# Patient Record
Sex: FEMALE | Race: WHITE | Employment: UNEMPLOYED
[De-identification: names, ages, dates, MRNs, and addresses within clinical notes are randomized per-mention and may not be internally consistent; named-entity substitution may affect disease eponyms.]

---

## 2017-02-24 ENCOUNTER — TELEPHONE (OUTPATIENT)
Dept: OBGYN | Facility: CLINIC | Age: 32
End: 2017-02-24

## 2017-02-24 ENCOUNTER — OFFICE VISIT (OUTPATIENT)
Facility: CLINIC | Age: 32
End: 2017-02-24

## 2017-02-24 ENCOUNTER — HOSPITAL ENCOUNTER (OUTPATIENT)
Age: 32
Setting detail: SPECIMEN
Discharge: HOME | End: 2017-02-24

## 2017-02-24 VITALS
HEART RATE: 103 BPM | TEMPERATURE: 98.5 F | OXYGEN SATURATION: 98 % | SYSTOLIC BLOOD PRESSURE: 100 MMHG | BODY MASS INDEX: 23.42 KG/M2 | HEIGHT: 63 IN | WEIGHT: 132.2 LBS | DIASTOLIC BLOOD PRESSURE: 80 MMHG

## 2017-02-24 DIAGNOSIS — N89.8 VAGINAL MASS: ICD-10-CM

## 2017-02-24 DIAGNOSIS — N89.8 VAGINAL DISCHARGE: Primary | ICD-10-CM

## 2017-02-24 PROCEDURE — 99215 OFFICE O/P EST HI 40 MIN: CPT | Performed by: PHYSICIAN ASSISTANT

## 2017-02-24 ASSESSMENT — ENCOUNTER SYMPTOMS
COUGH: 0
ABDOMINAL PAIN: 1
SHORTNESS OF BREATH: 0
CHEST TIGHTNESS: 0

## 2017-02-27 ENCOUNTER — HOSPITAL ENCOUNTER (OUTPATIENT)
Age: 32
Discharge: HOME | End: 2017-02-27
Payer: MEDICARE

## 2017-02-27 ENCOUNTER — OFFICE VISIT (OUTPATIENT)
Dept: OBGYN | Facility: CLINIC | Age: 32
End: 2017-02-27

## 2017-02-27 ENCOUNTER — HOSPITAL ENCOUNTER (OUTPATIENT)
Age: 32
Setting detail: SPECIMEN
Discharge: HOME | End: 2017-02-27
Payer: MEDICARE

## 2017-02-27 VITALS
WEIGHT: 128.1 LBS | DIASTOLIC BLOOD PRESSURE: 73 MMHG | BODY MASS INDEX: 23.57 KG/M2 | HEART RATE: 98 BPM | TEMPERATURE: 97.5 F | SYSTOLIC BLOOD PRESSURE: 109 MMHG | HEIGHT: 62 IN | RESPIRATION RATE: 16 BRPM

## 2017-02-27 DIAGNOSIS — N89.8 VAGINAL MASS: ICD-10-CM

## 2017-02-27 DIAGNOSIS — N89.8 VAGINAL DISCHARGE: Primary | ICD-10-CM

## 2017-02-27 DIAGNOSIS — Z12.4 CERVICAL CANCER SCREENING: ICD-10-CM

## 2017-02-27 DIAGNOSIS — N89.8 VAGINAL DISCHARGE: ICD-10-CM

## 2017-02-27 LAB
DIRECT EXAM: ABNORMAL
Lab: ABNORMAL
Lab: ABNORMAL
SPECIMEN DESCRIPTION: ABNORMAL
SPECIMEN DESCRIPTION: ABNORMAL
STATUS: ABNORMAL
STATUS: ABNORMAL

## 2017-02-27 PROCEDURE — 99202 OFFICE O/P NEW SF 15 MIN: CPT | Performed by: OBSTETRICS & GYNECOLOGY

## 2017-02-27 PROCEDURE — 99213 OFFICE O/P EST LOW 20 MIN: CPT | Performed by: OBSTETRICS & GYNECOLOGY

## 2017-02-28 LAB
C TRACH DNA GENITAL QL NAA+PROBE: NEGATIVE
N. GONORRHOEAE DNA: NEGATIVE

## 2017-03-01 LAB
HPV SAMPLE: ABNORMAL
HPV SOURCE: ABNORMAL
HPV, GENOTYPE 16: NOT DETECTED
HPV, GENOTYPE 18: NOT DETECTED
HPV, HIGH RISK OTHER: DETECTED
HPV, INTERPRETATION: ABNORMAL

## 2017-03-01 RX ORDER — METRONIDAZOLE 500 MG/1
500 TABLET ORAL 3 TIMES DAILY
Qty: 30 TABLET | Refills: 0 | Status: SHIPPED | OUTPATIENT
Start: 2017-03-01 | End: 2017-03-11

## 2017-03-03 PROBLEM — R87.810 ASCUS WITH POSITIVE HIGH RISK HPV CERVICAL: Status: ACTIVE | Noted: 2017-03-03

## 2017-03-03 PROBLEM — R87.610 ASCUS WITH POSITIVE HIGH RISK HPV CERVICAL: Status: ACTIVE | Noted: 2017-03-03

## 2017-03-07 LAB — CYTOLOGY REPORT: NORMAL

## 2017-11-17 ENCOUNTER — TELEPHONE (OUTPATIENT)
Dept: OBGYN | Age: 32
End: 2017-11-17
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
## 2018-07-24 ENCOUNTER — HOSPITAL ENCOUNTER (OUTPATIENT)
Age: 33
End: 2018-07-24

## 2018-07-24 ENCOUNTER — APPOINTMENT (OUTPATIENT)
Dept: GENERAL RADIOLOGY | Age: 33
End: 2018-07-24

## 2018-07-24 ENCOUNTER — HOSPITAL ENCOUNTER (EMERGENCY)
Age: 33
Discharge: HOME | End: 2018-07-24
Attending: EMERGENCY MEDICINE

## 2018-07-24 VITALS
OXYGEN SATURATION: 98 % | RESPIRATION RATE: 17 BRPM | HEART RATE: 123 BPM | SYSTOLIC BLOOD PRESSURE: 105 MMHG | TEMPERATURE: 98.9 F | BODY MASS INDEX: 23.78 KG/M2 | WEIGHT: 130 LBS | DIASTOLIC BLOOD PRESSURE: 74 MMHG

## 2018-07-24 DIAGNOSIS — T74.21XA SEXUAL ASSAULT OF ADULT, INITIAL ENCOUNTER: Primary | ICD-10-CM

## 2018-07-24 LAB
CHP ED QC CHECK: YES
PREGNANCY TEST URINE, POC: NORMAL

## 2018-07-24 PROCEDURE — 99283 EMERGENCY DEPT VISIT LOW MDM: CPT

## 2018-07-24 RX ORDER — METRONIDAZOLE 500 MG/1
1000 TABLET ORAL ONCE
Status: DISCONTINUED | OUTPATIENT
Start: 2018-07-24 | End: 2018-07-25 | Stop reason: HOSPADM

## 2018-07-24 RX ORDER — IBUPROFEN 800 MG/1
800 TABLET ORAL EVERY 8 HOURS PRN
Qty: 30 TABLET | Refills: 0 | Status: SHIPPED | OUTPATIENT
Start: 2018-07-24 | End: 2020-08-17

## 2018-07-24 RX ORDER — ONDANSETRON 4 MG/1
4 TABLET, FILM COATED ORAL ONCE
Status: DISCONTINUED | OUTPATIENT
Start: 2018-07-24 | End: 2018-07-25 | Stop reason: HOSPADM

## 2018-07-24 RX ORDER — OXYCODONE HYDROCHLORIDE AND ACETAMINOPHEN 5; 325 MG/1; MG/1
1 TABLET ORAL EVERY 4 HOURS PRN
COMMUNITY
End: 2018-12-19

## 2018-07-24 RX ORDER — AZITHROMYCIN 250 MG/1
1000 TABLET, FILM COATED ORAL ONCE
Status: DISCONTINUED | OUTPATIENT
Start: 2018-07-24 | End: 2018-07-25 | Stop reason: HOSPADM

## 2018-07-24 RX ORDER — ALPRAZOLAM 0.25 MG/1
0.25 TABLET ORAL NIGHTLY PRN
COMMUNITY
End: 2018-12-19

## 2018-07-24 ASSESSMENT — PAIN DESCRIPTION - PAIN TYPE: TYPE: ACUTE PAIN

## 2018-07-24 ASSESSMENT — PAIN SCALES - GENERAL: PAINLEVEL_OUTOF10: 10

## 2018-07-24 NOTE — ED NOTES
Bed: 44  Expected date:   Expected time:   Means of arrival:   Comments:  Radha Flores RN  07/24/18 1958

## 2018-07-25 ASSESSMENT — ENCOUNTER SYMPTOMS
CHEST TIGHTNESS: 0
TROUBLE SWALLOWING: 0
COUGH: 0
SORE THROAT: 0
PHOTOPHOBIA: 0
SHORTNESS OF BREATH: 0
NAUSEA: 0
WHEEZING: 0
BACK PAIN: 0
ABDOMINAL PAIN: 0
VOMITING: 0
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
## 2018-12-19 ENCOUNTER — HOSPITAL ENCOUNTER (OUTPATIENT)
Age: 33
Setting detail: SPECIMEN
Discharge: HOME | End: 2018-12-19

## 2018-12-21 LAB
HPV SAMPLE: NORMAL
HPV SOURCE: NORMAL
HPV, GENOTYPE 16: NOT DETECTED
HPV, GENOTYPE 18: NOT DETECTED
HPV, HIGH RISK OTHER: NOT DETECTED
HPV, INTERPRETATION: NORMAL

## 2019-01-07 LAB — CYTOLOGY REPORT: NORMAL

## 2019-04-23 ENCOUNTER — HOSPITAL ENCOUNTER (OUTPATIENT)
Age: 34
Setting detail: SPECIMEN
Discharge: HOME | End: 2019-04-23
Payer: COMMERCIAL

## 2019-04-23 DIAGNOSIS — Z20.2 POSSIBLE EXPOSURE TO STD: ICD-10-CM

## 2019-04-23 DIAGNOSIS — N89.8 VAGINAL ODOR: ICD-10-CM

## 2019-04-24 LAB
C. TRACHOMATIS DNA ,URINE: NEGATIVE
DIRECT EXAM: ABNORMAL
Lab: ABNORMAL
N. GONORRHOEAE DNA, URINE: NEGATIVE
SPECIMEN DESCRIPTION: ABNORMAL
SPECIMEN DESCRIPTION: NORMAL

## 2019-05-06 ENCOUNTER — HOSPITAL ENCOUNTER (OUTPATIENT)
Dept: ULTRASOUND IMAGING | Age: 34
Discharge: HOME | End: 2019-05-08
Payer: MEDICAID

## 2019-05-06 ENCOUNTER — HOSPITAL ENCOUNTER (OUTPATIENT)
Dept: MAMMOGRAPHY | Age: 34
Discharge: HOME | End: 2019-05-08
Payer: MEDICAID

## 2019-05-06 DIAGNOSIS — R92.8 ABNORMAL MAMMOGRAM: ICD-10-CM

## 2019-05-06 DIAGNOSIS — N64.4 BREAST TENDERNESS IN FEMALE: ICD-10-CM

## 2019-05-06 DIAGNOSIS — Z80.3 FAMILY HISTORY OF BREAST CANCER IN MOTHER: ICD-10-CM

## 2019-05-06 PROCEDURE — 76642 ULTRASOUND BREAST LIMITED: CPT

## 2019-05-06 PROCEDURE — G0279 TOMOSYNTHESIS, MAMMO: HCPCS

## 2019-05-21 ENCOUNTER — HOSPITAL ENCOUNTER (OUTPATIENT)
Dept: MRI IMAGING | Age: 34
Discharge: HOME | End: 2019-05-23
Payer: MEDICAID

## 2019-05-21 DIAGNOSIS — R92.8 MAMMOGRAM ABNORMAL: ICD-10-CM

## 2019-05-21 PROCEDURE — 77049 MRI BREAST C-+ W/CAD BI: CPT

## 2019-05-21 PROCEDURE — 6360000004 HC RX CONTRAST MEDICATION: Performed by: NURSE PRACTITIONER

## 2019-05-21 PROCEDURE — 2580000003 HC RX 258: Performed by: NURSE PRACTITIONER

## 2019-05-21 PROCEDURE — 2500000003 HC RX 250 WO HCPCS: Performed by: NURSE PRACTITIONER

## 2019-05-21 PROCEDURE — A9579 GAD-BASE MR CONTRAST NOS,1ML: HCPCS | Performed by: NURSE PRACTITIONER

## 2019-05-21 RX ORDER — SODIUM CHLORIDE 0.9 % (FLUSH) 0.9 %
10 SYRINGE (ML) INJECTION
Status: COMPLETED | OUTPATIENT
Start: 2019-05-21 | End: 2019-05-21

## 2019-05-21 RX ORDER — BACTERIOSTATIC SODIUM CHLORIDE 0.9 %
20 VIAL (ML) INJECTION
Status: COMPLETED | OUTPATIENT
Start: 2019-05-21 | End: 2019-05-21

## 2019-05-21 RX ADMIN — SODIUM CHLORIDE 20 ML: 9 INJECTION INTRAMUSCULAR; INTRAVENOUS; SUBCUTANEOUS at 11:22

## 2019-05-21 RX ADMIN — Medication 10 ML: at 11:23

## 2019-05-21 RX ADMIN — GADOTERIDOL 15 ML: 279.3 INJECTION, SOLUTION INTRAVENOUS at 11:22

## 2019-06-17 ENCOUNTER — APPOINTMENT (OUTPATIENT)
Dept: GENERAL RADIOLOGY | Age: 34
End: 2019-06-17
Payer: MEDICAID

## 2019-06-17 ENCOUNTER — HOSPITAL ENCOUNTER (EMERGENCY)
Age: 34
Discharge: LEFT BEFORE DISCHARGE | End: 2019-06-17
Attending: EMERGENCY MEDICINE
Payer: MEDICAID

## 2019-06-17 VITALS
BODY MASS INDEX: 27.6 KG/M2 | TEMPERATURE: 98.2 F | OXYGEN SATURATION: 99 % | HEART RATE: 92 BPM | DIASTOLIC BLOOD PRESSURE: 73 MMHG | WEIGHT: 150 LBS | HEIGHT: 62 IN | SYSTOLIC BLOOD PRESSURE: 104 MMHG | RESPIRATION RATE: 16 BRPM

## 2019-06-17 LAB
-: ABNORMAL
AMORPHOUS: ABNORMAL
BACTERIA: ABNORMAL
BILIRUBIN URINE: NEGATIVE
CASTS UA: ABNORMAL /LPF
COLOR: YELLOW
COMMENT UA: ABNORMAL
CRYSTALS, UA: ABNORMAL /HPF
EPITHELIAL CELLS UA: ABNORMAL /HPF (ref 0–5)
GLUCOSE URINE: NEGATIVE
KETONES, URINE: NEGATIVE
LEUKOCYTE ESTERASE, URINE: ABNORMAL
MUCUS: ABNORMAL
NITRITE, URINE: POSITIVE
OTHER OBSERVATIONS UA: ABNORMAL
PH UA: 6 (ref 5–8)
PROTEIN UA: NEGATIVE
RBC UA: ABNORMAL /HPF (ref 0–2)
RENAL EPITHELIAL, UA: ABNORMAL /HPF
SPECIFIC GRAVITY UA: 1.02 (ref 1–1.03)
TRICHOMONAS: ABNORMAL
TURBIDITY: ABNORMAL
URINE HGB: NEGATIVE
UROBILINOGEN, URINE: NORMAL
WBC UA: ABNORMAL /HPF (ref 0–5)
YEAST: ABNORMAL

## 2019-06-17 PROCEDURE — 87186 SC STD MICRODIL/AGAR DIL: CPT

## 2019-06-17 PROCEDURE — 81001 URINALYSIS AUTO W/SCOPE: CPT

## 2019-06-17 PROCEDURE — 99283 EMERGENCY DEPT VISIT LOW MDM: CPT

## 2019-06-17 PROCEDURE — 72072 X-RAY EXAM THORAC SPINE 3VWS: CPT

## 2019-06-17 PROCEDURE — 87088 URINE BACTERIA CULTURE: CPT

## 2019-06-17 PROCEDURE — 72100 X-RAY EXAM L-S SPINE 2/3 VWS: CPT

## 2019-06-17 PROCEDURE — 87086 URINE CULTURE/COLONY COUNT: CPT

## 2019-06-17 PROCEDURE — 81025 URINE PREGNANCY TEST: CPT

## 2019-06-17 PROCEDURE — 72040 X-RAY EXAM NECK SPINE 2-3 VW: CPT

## 2019-06-17 ASSESSMENT — ENCOUNTER SYMPTOMS
ABDOMINAL PAIN: 0
RHINORRHEA: 0
WHEEZING: 0
SHORTNESS OF BREATH: 0
CONSTIPATION: 0
COLOR CHANGE: 0
DIARRHEA: 0
COUGH: 0
SINUS PRESSURE: 0
VOMITING: 0
SORE THROAT: 0
NAUSEA: 0

## 2019-06-17 ASSESSMENT — PAIN DESCRIPTION - PAIN TYPE: TYPE: ACUTE PAIN

## 2019-06-17 ASSESSMENT — PAIN DESCRIPTION - LOCATION: LOCATION: NECK

## 2019-06-17 ASSESSMENT — PAIN DESCRIPTION - DESCRIPTORS: DESCRIPTORS: SHARP;SHOOTING;STABBING

## 2019-06-17 ASSESSMENT — PAIN DESCRIPTION - ORIENTATION: ORIENTATION: POSTERIOR

## 2019-06-17 ASSESSMENT — PAIN SCALES - GENERAL: PAINLEVEL_OUTOF10: 10
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
## 2019-06-17 NOTE — ED PROVIDER NOTES
Patient left the emergency department without notifying staff. I did not see her.      Briseyda Campos MD  06/17/19 4502

## 2019-06-18 LAB — HCG, PREGNANCY URINE (POC): NEGATIVE

## 2019-06-19 LAB
CULTURE: ABNORMAL
Lab: ABNORMAL
SPECIMEN DESCRIPTION: ABNORMAL

## 2020-08-12 ENCOUNTER — HOSPITAL ENCOUNTER (EMERGENCY)
Age: 35
Discharge: HOME | End: 2020-08-12
Attending: EMERGENCY MEDICINE
Payer: COMMERCIAL

## 2020-08-12 VITALS
HEART RATE: 95 BPM | HEIGHT: 62 IN | SYSTOLIC BLOOD PRESSURE: 149 MMHG | WEIGHT: 180 LBS | TEMPERATURE: 98.7 F | RESPIRATION RATE: 18 BRPM | OXYGEN SATURATION: 99 % | DIASTOLIC BLOOD PRESSURE: 80 MMHG | BODY MASS INDEX: 33.13 KG/M2

## 2020-08-12 PROCEDURE — 99283 EMERGENCY DEPT VISIT LOW MDM: CPT

## 2020-08-12 NOTE — ED PROVIDER NOTES
EMERGENCY DEPARTMENT ENCOUNTER    Pt Name: Daniel Ewing  MRN: [id number]  [place] 1985  Date of evaluation: 20  CHIEF COMPLAINT       Chief Complaint   Patient presents with    Hand Numbness     HISTORY OF PRESENT ILLNESS   29-year-old female complains of right hand weakness that started this morning when she awoke. She does have some issues with cervical neck discomfort and herniated disks. She has had no injuries. She states when she went to  a cigarette this morning she dropped it because her hand was weak. She has no sensory deficits. REVIEW OF SYSTEMS     Review of Systems   All other systems reviewed and are negative. PASTMEDICAL HISTORY     Past Medical History:   Diagnosis Date    Cervical radiculopathy 2014    H/O ganglion cyst     Herniated disc, cervical     c4-5, 5-6,6-7    Migraine     Nephrolithiasis     passes on own    Numbness and tingling     in different degrees all extremities     Past Problem List  Patient Active Problem List   Diagnosis Code    Neck pain M54.2    Cervical disc herniation M50.20    Cervical radiculopathy M54.12    Degenerative disc disease, cervical M50.30    Chronic nonintractable headache R51    Chronic post-traumatic headache, not intractable G44.329    Fusion of spine, cervical region M43.22    ASCUS with positive high risk HPV cervical R87.610, R87.810     SURGICAL HISTORY       Past Surgical History:   Procedure Laterality Date    CERVICAL DISCECTOMY  14    with fusion C4-5, C5-6     SECTION      x3    WRIST GANGLION EXCISION Left      CURRENT MEDICATIONS       Previous Medications    HYDROXYZINE (ATARAX) 25 MG TABLET    1/2 to 1 po TID prn anxiety may take 2 at bedtime for sleep.     IBUPROFEN (ADVIL;MOTRIN) 800 MG TABLET    Take 1 tablet by mouth every 8 hours as needed for Pain    MEDROXYPROGESTERONE (DEPO-PROVERA) 150 MG/ML INJECTION    Inject 1 mL into the muscle once for 1 dose TIZANIDINE (ZANAFLEX) 4 MG TABLET    TAKE 1 TABLET BY MOUTH EVERY 8 HOURS AS NEEDED (MUSCLE SPASMS)    TRAZODONE (DESYREL) 50 MG TABLET    Take 1 tablet by mouth nightly    VENLAFAXINE (EFFEXOR XR) 37.5 MG EXTENDED RELEASE CAPSULE    1 po daily for 5 days then increase to 2 po daily     ALLERGIES     is allergic to norco [hydrocodone-acetaminophen]. FAMILY HISTORY     She indicated that her mother is . She indicated that her father is alive. SOCIAL HISTORY       Social History     Tobacco Use    Smoking status: Current Every Day Smoker     Packs/day: 0.50     Years: 10.00     Pack years: 5.00     Types: Cigarettes    Smokeless tobacco: Never Used   Substance Use Topics    Alcohol use: No    Drug use: No     PHYSICAL EXAM     INITIAL VITALS: BP (!) 149/80   Pulse 95   Temp 98.7 °F (37.1 °C) (Oral)   Resp 18   Ht 5' 2\" (1.575 m)   Wt 180 lb (81.6 kg)   SpO2 99%   BMI 32.92 kg/m²    Physical Exam  Constitutional:       General: She is not in acute distress. Appearance: She is well-developed. HENT:      Head: Normocephalic. Eyes:      Pupils: Pupils are equal, round, and reactive to light. Cardiovascular:      Rate and Rhythm: Normal rate and regular rhythm. Heart sounds: Normal heart sounds. Pulmonary:      Effort: Pulmonary effort is normal. No respiratory distress. Breath sounds: Normal breath sounds. Abdominal:      General: Bowel sounds are normal.      Palpations: Abdomen is soft. Tenderness: There is no abdominal tenderness. Musculoskeletal: Normal range of motion. Skin:     General: Skin is warm and dry. Neurological:      Mental Status: She is alert and oriented to person, place, and time. Comments: Patient has right-sided wrist droop and inability to fully extend the right wrist and digits.   She has normal sensation to the wrist.         MEDICAL DECISION MAKIN-year-old female coming into the emergency room with right-sided weakness to the wrist and digits. Digits most affected are the third through fifth. No sensory deficits on exam.  Physical exam is most consistent with wrist drop or radial nerve palsy. Patient given brace here in the emergency room and instructed to follow-up with neurology. CRITICAL CARE:       PROCEDURES:    Procedures    DIAGNOSTIC RESULTS   EKG:All EKG's are interpreted by the Emergency Department Physician who either signs or Co-signs this chart in the absence of a cardiologist.        RADIOLOGY:All plain film, CT, MRI, and formal ultrasound images (except ED bedside ultrasound) are read by the radiologist, see reports below, unless otherwisenoted in MDM or here. No orders to display     LABS: All lab results were reviewed by myself, and all abnormals are listed below. Labs Reviewed - No data to display    EMERGENCY DEPARTMENTCOURSE:         Vitals:    Vitals:    08/12/20 1815   BP: (!) 149/80   Pulse: 95   Resp: 18   Temp: 98.7 °F (37.1 °C)   TempSrc: Oral   SpO2: 99%   Weight: 180 lb (81.6 kg)   Height: 5' 2\" (1.575 m)       The patient was given the following medications while in the emergency department:  No orders of the defined types were placed in this encounter. CONSULTS:  None    FINAL IMPRESSION      1.  Right wrist drop          DISPOSITION/PLAN   DISPOSITION Decision To Discharge 08/12/2020 07:08:22 PM      PATIENT REFERRED TO:  MD Casandra Auguste [address] [place] [address]  [phone]    Schedule an appointment as soon as possible for a visit in 1 week      DISCHARGE MEDICATIONS:  New Prescriptions    No medications on file     Justin Paige MD  Attending Emergency Physician                  Low Mcdaniel MD  08/12/20 7929

## 2020-08-12 NOTE — ED NOTES
Pt states that she is unable to move the last 3 digits on her right hand. The fingers, and bilateral ankles and knees are also swollen. Pt states she had neck surgery in 2014 that has caused pinching of the nerves on the right side.        Darron Barrera RN  08/12/20 4619

## 2020-08-17 ENCOUNTER — OFFICE VISIT (OUTPATIENT)
Dept: NEUROLOGY | Age: 35
End: 2020-08-17
Payer: COMMERCIAL

## 2020-08-17 VITALS
HEART RATE: 101 BPM | WEIGHT: 175 LBS | TEMPERATURE: 97.2 F | BODY MASS INDEX: 32.01 KG/M2 | SYSTOLIC BLOOD PRESSURE: 123 MMHG | DIASTOLIC BLOOD PRESSURE: 81 MMHG

## 2020-08-17 PROCEDURE — G8427 DOCREV CUR MEDS BY ELIG CLIN: HCPCS | Performed by: PSYCHIATRY & NEUROLOGY

## 2020-08-17 PROCEDURE — 99204 OFFICE O/P NEW MOD 45 MIN: CPT | Performed by: PSYCHIATRY & NEUROLOGY

## 2020-08-17 PROCEDURE — G8417 CALC BMI ABV UP PARAM F/U: HCPCS | Performed by: PSYCHIATRY & NEUROLOGY

## 2020-08-17 PROCEDURE — 4004F PT TOBACCO SCREEN RCVD TLK: CPT | Performed by: PSYCHIATRY & NEUROLOGY

## 2020-08-17 RX ORDER — GABAPENTIN 100 MG/1
100 CAPSULE ORAL 3 TIMES DAILY
Qty: 90 CAPSULE | Refills: 2 | Status: SHIPPED | OUTPATIENT
Start: 2020-08-17 | End: 2020-11-10 | Stop reason: DRUGHIGH
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
## 2020-10-01 ENCOUNTER — TELEPHONE (OUTPATIENT)
Dept: ADMINISTRATIVE | Age: 35
End: 2020-10-01

## 2020-10-06 NOTE — TELEPHONE ENCOUNTER
There are open orders in her chart that they can print off at [address] when she goes to have them drawn. They are good until 5/2021.

## 2020-11-07 ENCOUNTER — HOSPITAL ENCOUNTER (OUTPATIENT)
Age: 35
Discharge: HOME | End: 2020-11-07
Payer: MEDICARE

## 2020-11-07 LAB
ALBUMIN SERPL-MCNC: 4.1 G/DL (ref 3.5–5.2)
ALBUMIN/GLOBULIN RATIO: 1.5 (ref 1–2.5)
ALP BLD-CCNC: 112 U/L (ref 35–104)
ALT SERPL-CCNC: 18 U/L (ref 5–33)
ANION GAP SERPL CALCULATED.3IONS-SCNC: 8 MMOL/L (ref 9–17)
AST SERPL-CCNC: 18 U/L
BILIRUB SERPL-MCNC: <0.1 MG/DL (ref 0.3–1.2)
BUN BLDV-MCNC: 11 MG/DL (ref 6–20)
BUN/CREAT BLD: ABNORMAL (ref 9–20)
CALCIUM SERPL-MCNC: 8.9 MG/DL (ref 8.6–10.4)
CHLORIDE BLD-SCNC: 105 MMOL/L (ref 98–107)
CO2: 27 MMOL/L (ref 20–31)
CREAT SERPL-MCNC: 0.65 MG/DL (ref 0.5–0.9)
GFR AFRICAN AMERICAN: >60 ML/MIN
GFR NON-AFRICAN AMERICAN: >60 ML/MIN
GFR SERPL CREATININE-BSD FRML MDRD: ABNORMAL ML/MIN/{1.73_M2}
GFR SERPL CREATININE-BSD FRML MDRD: ABNORMAL ML/MIN/{1.73_M2}
GLUCOSE BLD-MCNC: 105 MG/DL (ref 70–99)
HCT VFR BLD CALC: 38.5 % (ref 36.3–47.1)
HEMOGLOBIN: 12.2 G/DL (ref 11.9–15.1)
MCH RBC QN AUTO: 28.4 PG (ref 25.2–33.5)
MCHC RBC AUTO-ENTMCNC: 31.7 G/DL (ref 28.4–34.8)
MCV RBC AUTO: 89.7 FL (ref 82.6–102.9)
NRBC AUTOMATED: 0 PER 100 WBC
PDW BLD-RTO: 13.6 % (ref 11.8–14.4)
PLATELET # BLD: 267 K/UL (ref 138–453)
PMV BLD AUTO: 10.5 FL (ref 8.1–13.5)
POTASSIUM SERPL-SCNC: 3.9 MMOL/L (ref 3.7–5.3)
RBC # BLD: 4.29 M/UL (ref 3.95–5.11)
SODIUM BLD-SCNC: 140 MMOL/L (ref 135–144)
THYROXINE, FREE: 1.02 NG/DL (ref 0.93–1.7)
TOTAL PROTEIN: 6.8 G/DL (ref 6.4–8.3)
TSH SERPL DL<=0.05 MIU/L-ACNC: 1.64 MIU/L (ref 0.3–5)
WBC # BLD: 8.2 K/UL (ref 3.5–11.3)

## 2020-11-07 PROCEDURE — 84443 ASSAY THYROID STIM HORMONE: CPT

## 2020-11-07 PROCEDURE — 80053 COMPREHEN METABOLIC PANEL: CPT

## 2020-11-07 PROCEDURE — 85027 COMPLETE CBC AUTOMATED: CPT

## 2020-11-07 PROCEDURE — 84439 ASSAY OF FREE THYROXINE: CPT

## 2020-11-07 PROCEDURE — 36415 COLL VENOUS BLD VENIPUNCTURE: CPT

## 2020-12-22 ENCOUNTER — HOSPITAL ENCOUNTER (OUTPATIENT)
Age: 35
Discharge: HOME | End: 2020-12-24
Payer: COMMERCIAL

## 2020-12-22 ENCOUNTER — HOSPITAL ENCOUNTER (EMERGENCY)
Age: 35
Discharge: LEFT BEFORE DISCHARGE | End: 2020-12-22
Attending: EMERGENCY MEDICINE
Payer: COMMERCIAL

## 2020-12-22 VITALS
RESPIRATION RATE: 16 BRPM | HEART RATE: 113 BPM | TEMPERATURE: 98.3 F | HEIGHT: 63 IN | SYSTOLIC BLOOD PRESSURE: 129 MMHG | OXYGEN SATURATION: 94 % | BODY MASS INDEX: 31.89 KG/M2 | DIASTOLIC BLOOD PRESSURE: 95 MMHG | WEIGHT: 180 LBS

## 2020-12-22 LAB
-: ABNORMAL
AMORPHOUS: ABNORMAL
BACTERIA: ABNORMAL
BILIRUBIN URINE: NEGATIVE
CASTS UA: ABNORMAL /LPF (ref 0–2)
COLOR: YELLOW
COMMENT UA: ABNORMAL
CRYSTALS, UA: ABNORMAL /HPF
CRYSTALS, UA: ABNORMAL /HPF
DIRECT EXAM: ABNORMAL
EPITHELIAL CELLS UA: ABNORMAL /HPF (ref 0–5)
GLUCOSE URINE: NEGATIVE
HCG(URINE) PREGNANCY TEST: NEGATIVE
HIV AG/AB: NONREACTIVE
KETONES, URINE: NEGATIVE
LEUKOCYTE ESTERASE, URINE: ABNORMAL
Lab: ABNORMAL
MUCUS: ABNORMAL
NITRITE, URINE: NEGATIVE
OTHER OBSERVATIONS UA: ABNORMAL
PH UA: 5 (ref 5–8)
PROTEIN UA: NEGATIVE
RBC UA: ABNORMAL /HPF (ref 0–2)
RENAL EPITHELIAL, UA: ABNORMAL /HPF
SPECIFIC GRAVITY UA: 1.03 (ref 1–1.03)
SPECIMEN DESCRIPTION: ABNORMAL
T. PALLIDUM, IGG: NONREACTIVE
TRICHOMONAS: ABNORMAL
TURBIDITY: CLEAR
URINE HGB: NEGATIVE
UROBILINOGEN, URINE: NORMAL
WBC UA: ABNORMAL /HPF (ref 0–5)
YEAST: ABNORMAL

## 2020-12-22 PROCEDURE — 2720000011 HC SANE KIT SUPPLY STERILE

## 2020-12-22 PROCEDURE — 86780 TREPONEMA PALLIDUM: CPT

## 2020-12-22 PROCEDURE — 87389 HIV-1 AG W/HIV-1&-2 AB AG IA: CPT

## 2020-12-22 PROCEDURE — 87480 CANDIDA DNA DIR PROBE: CPT

## 2020-12-22 PROCEDURE — 87491 CHLMYD TRACH DNA AMP PROBE: CPT

## 2020-12-22 PROCEDURE — 87510 GARDNER VAG DNA DIR PROBE: CPT

## 2020-12-22 PROCEDURE — 81025 URINE PREGNANCY TEST: CPT

## 2020-12-22 PROCEDURE — 99284 EMERGENCY DEPT VISIT MOD MDM: CPT

## 2020-12-22 PROCEDURE — 87591 N.GONORRHOEAE DNA AMP PROB: CPT

## 2020-12-22 PROCEDURE — 87660 TRICHOMONAS VAGIN DIR PROBE: CPT

## 2020-12-22 PROCEDURE — 81001 URINALYSIS AUTO W/SCOPE: CPT

## 2020-12-22 RX ORDER — CEFTRIAXONE 500 MG/1
500 INJECTION, POWDER, FOR SOLUTION INTRAMUSCULAR; INTRAVENOUS ONCE
Status: DISCONTINUED | OUTPATIENT
Start: 2020-12-22 | End: 2020-12-23 | Stop reason: HOSPADM

## 2020-12-22 RX ORDER — DOXYCYCLINE HYCLATE 100 MG
100 TABLET ORAL ONCE
Status: DISCONTINUED | OUTPATIENT
Start: 2020-12-22 | End: 2020-12-23 | Stop reason: HOSPADM

## 2020-12-22 ASSESSMENT — ENCOUNTER SYMPTOMS
RHINORRHEA: 0
APNEA: 0
NAUSEA: 0
VOMITING: 0
ABDOMINAL PAIN: 0
EYE PAIN: 0
SHORTNESS OF BREATH: 0
COUGH: 0
DIARRHEA: 0
WHEEZING: 0
BLOOD IN STOOL: 0
SORE THROAT: 0

## 2020-12-23 LAB
C TRACH DNA GENITAL QL NAA+PROBE: NEGATIVE
N. GONORRHOEAE DNA: NEGATIVE
SPECIMEN DESCRIPTION: NORMAL
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
## 2020-12-23 NOTE — ED NOTES
JAXSON examination and kit started in accordance to [facility] policy and protocol.    JAXSON Walsh RN and pt in ED room 25 at time of exam.
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
## 2020-12-23 NOTE — ED NOTES
Writer went into pt room to check on patient.  Pt not found in room     Maxine Leon [facility]  12/22/20 0682

## 2020-12-23 NOTE — ED NOTES
met with patient at bedside. Patient reported she is currently linked with [employer] and has a counselor but is unsure if she will continue with them as she is not very satisfied with the counselor she currently has.  provided patient with active listening regarding her current life stressors.  also provided patient with information on [address] [place] [address] if needed.          DORA Barba, FRANCISCA Gibbs  12/22/20 [id number]
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
## 2021-02-24 ENCOUNTER — HOSPITAL ENCOUNTER (OUTPATIENT)
Age: 36
Setting detail: SPECIMEN
Discharge: HOME | End: 2021-02-24
Payer: MEDICARE

## 2021-02-24 DIAGNOSIS — N89.8 VAGINAL DISCHARGE: ICD-10-CM

## 2021-02-24 DIAGNOSIS — R31.1 BENIGN ESSENTIAL MICROSCOPIC HEMATURIA: ICD-10-CM

## 2021-02-25 LAB
CULTURE: NORMAL
DIRECT EXAM: ABNORMAL
Lab: ABNORMAL
Lab: NORMAL
SPECIMEN DESCRIPTION: ABNORMAL
SPECIMEN DESCRIPTION: NORMAL

## 2021-08-25 ENCOUNTER — OFFICE VISIT (OUTPATIENT)
Dept: NEUROLOGY | Age: 36
End: 2021-08-25
Payer: MEDICARE

## 2021-08-25 VITALS
SYSTOLIC BLOOD PRESSURE: 133 MMHG | DIASTOLIC BLOOD PRESSURE: 86 MMHG | BODY MASS INDEX: 35.15 KG/M2 | HEART RATE: 110 BPM | HEIGHT: 62 IN | WEIGHT: 191 LBS

## 2021-08-25 DIAGNOSIS — G95.9 MYELOPATHY (HCC): Primary | ICD-10-CM

## 2021-08-25 DIAGNOSIS — M54.16 LUMBAR RADICULOPATHY: ICD-10-CM

## 2021-08-25 PROCEDURE — 99214 OFFICE O/P EST MOD 30 MIN: CPT | Performed by: PSYCHIATRY & NEUROLOGY

## 2021-08-25 PROCEDURE — G8427 DOCREV CUR MEDS BY ELIG CLIN: HCPCS | Performed by: PSYCHIATRY & NEUROLOGY

## 2021-08-25 PROCEDURE — 4004F PT TOBACCO SCREEN RCVD TLK: CPT | Performed by: PSYCHIATRY & NEUROLOGY

## 2021-08-25 PROCEDURE — G8417 CALC BMI ABV UP PARAM F/U: HCPCS | Performed by: PSYCHIATRY & NEUROLOGY

## 2021-08-25 RX ORDER — DIAZEPAM 5 MG/1
TABLET ORAL
Qty: 1 TABLET | Refills: 0 | Status: SHIPPED | OUTPATIENT
Start: 2021-08-25 | End: 2021-08-25

## 2021-08-25 RX ORDER — IBUPROFEN 200 MG
200 TABLET ORAL EVERY 6 HOURS PRN
COMMUNITY
End: 2022-07-13 | Stop reason: SDUPTHER
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
## 2021-08-26 ENCOUNTER — HOSPITAL ENCOUNTER (OUTPATIENT)
Dept: MRI IMAGING | Age: 36
Discharge: HOME | End: 2021-08-28
Payer: MEDICARE

## 2021-08-26 DIAGNOSIS — G95.9 MYELOPATHY (HCC): ICD-10-CM

## 2021-08-26 DIAGNOSIS — M54.16 LUMBAR RADICULOPATHY: ICD-10-CM

## 2021-08-26 PROCEDURE — 72158 MRI LUMBAR SPINE W/O & W/DYE: CPT

## 2021-08-26 PROCEDURE — 6360000004 HC RX CONTRAST MEDICATION: Performed by: PSYCHIATRY & NEUROLOGY

## 2021-08-26 PROCEDURE — A9579 GAD-BASE MR CONTRAST NOS,1ML: HCPCS | Performed by: PSYCHIATRY & NEUROLOGY

## 2021-08-26 PROCEDURE — 72156 MRI NECK SPINE W/O & W/DYE: CPT

## 2021-08-26 RX ADMIN — GADOTERIDOL 18 ML: 279.3 INJECTION, SOLUTION INTRAVENOUS at 11:21

## 2021-08-27 ENCOUNTER — TELEPHONE (OUTPATIENT)
Dept: NEUROLOGY | Age: 36
End: 2021-08-27

## 2021-08-27 DIAGNOSIS — R93.89 ABNORMAL MRI: Primary | ICD-10-CM

## 2021-08-27 NOTE — TELEPHONE ENCOUNTER
----- Message from Nathaniel Mccann MD sent at 8/26/2021  5:27 PM EDT -----  MRI C and L juan carlos shows  djd. Can we refer her to neurosurgery for an opinion.  Thanks

## 2021-08-27 NOTE — TELEPHONE ENCOUNTER
----- Message from Miryam Edmond MD sent at 8/26/2021  5:27 PM EDT -----  MRI C and L juan carlos shows  djd. Can we refer her to neurosurgery for an opinion.  Thanks

## 2021-08-27 NOTE — TELEPHONE ENCOUNTER
Jason Hernandez called the office back and this information was given. Patient is agreeable to see [facility] neurosurgery. I told her I would generate the order and it would be sent electronically. Neurosurgery phone number given to Jason Hernandez to call if she doesn't hear something by Monday afternoon. I asked that she call if there were any problems. Jason Hernandez verbally stated her understanding.

## 2021-09-03 ENCOUNTER — HOSPITAL ENCOUNTER (OUTPATIENT)
Dept: GENERAL RADIOLOGY | Age: 36
Discharge: HOME | End: 2021-09-05
Payer: MEDICARE

## 2021-09-03 ENCOUNTER — HOSPITAL ENCOUNTER (OUTPATIENT)
Age: 36
Discharge: HOME | End: 2021-09-05
Payer: MEDICARE

## 2021-09-03 ENCOUNTER — OFFICE VISIT (OUTPATIENT)
Dept: NEUROSURGERY | Age: 36
End: 2021-09-03
Payer: MEDICARE

## 2021-09-03 VITALS
WEIGHT: 191 LBS | BODY MASS INDEX: 35.15 KG/M2 | HEART RATE: 121 BPM | SYSTOLIC BLOOD PRESSURE: 107 MMHG | HEIGHT: 62 IN | DIASTOLIC BLOOD PRESSURE: 73 MMHG

## 2021-09-03 DIAGNOSIS — M47.816 LUMBAR SPONDYLOSIS: Primary | ICD-10-CM

## 2021-09-03 DIAGNOSIS — Z98.1 HISTORY OF FUSION OF CERVICAL SPINE: ICD-10-CM

## 2021-09-03 DIAGNOSIS — M47.12 CERVICAL SPONDYLOSIS WITH MYELOPATHY: ICD-10-CM

## 2021-09-03 PROCEDURE — 99204 OFFICE O/P NEW MOD 45 MIN: CPT | Performed by: NURSE PRACTITIONER

## 2021-09-03 PROCEDURE — 72040 X-RAY EXAM NECK SPINE 2-3 VW: CPT

## 2021-09-03 PROCEDURE — G8417 CALC BMI ABV UP PARAM F/U: HCPCS | Performed by: NURSE PRACTITIONER

## 2021-09-03 PROCEDURE — G8427 DOCREV CUR MEDS BY ELIG CLIN: HCPCS | Performed by: NURSE PRACTITIONER

## 2021-09-03 PROCEDURE — 4004F PT TOBACCO SCREEN RCVD TLK: CPT | Performed by: NURSE PRACTITIONER
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
## 2021-09-09 ENCOUNTER — HOSPITAL ENCOUNTER (OUTPATIENT)
Dept: PHYSICAL THERAPY | Age: 36
Setting detail: THERAPIES SERIES
Discharge: HOME | End: 2021-09-09
Payer: MEDICARE

## 2021-09-09 NOTE — FLOWSHEET NOTE
[x] [facility] [facility]  [address]    [phone]  F: [phone]          Physical Therapy Cancel/No Show note    Date: 2021  Patient: Allen Woods  : 1985  MRN: [id number]    Cancels/No Shows to date:     For today's appointment patient:    [x]  Cancelled    [x] Rescheduled appointment    [] No-show     Reason given by patient:    []  Patient ill    []  Conflicting appointment    [] No transportation      [] Conflict with work    [] No reason given    [] Weather related    [] COVID-19    [x] Other:      Comments:  Pt cancelled for initial evaluation for Physical Therapy for neck and back, due to family issue. Pt rescheduled to .          [] Next appointment was confirmed    Electronically signed by: Heena Mcnair PT

## 2021-09-14 ENCOUNTER — HOSPITAL ENCOUNTER (OUTPATIENT)
Dept: PHYSICAL THERAPY | Age: 36
Setting detail: THERAPIES SERIES
Discharge: HOME | End: 2021-09-14
Payer: MEDICARE

## 2021-09-14 PROCEDURE — 97110 THERAPEUTIC EXERCISES: CPT

## 2021-09-14 PROCEDURE — 97162 PT EVAL MOD COMPLEX 30 MIN: CPT
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
## 2021-10-27 ENCOUNTER — TELEMEDICINE (OUTPATIENT)
Dept: NEUROLOGY | Age: 36
End: 2021-10-27
Payer: MEDICARE

## 2021-10-27 DIAGNOSIS — M54.10 RADICULOPATHY, UNSPECIFIED SPINAL REGION: Primary | ICD-10-CM

## 2021-10-27 PROCEDURE — 4004F PT TOBACCO SCREEN RCVD TLK: CPT | Performed by: PSYCHIATRY & NEUROLOGY

## 2021-10-27 PROCEDURE — G8484 FLU IMMUNIZE NO ADMIN: HCPCS | Performed by: PSYCHIATRY & NEUROLOGY

## 2021-10-27 PROCEDURE — G8428 CUR MEDS NOT DOCUMENT: HCPCS | Performed by: PSYCHIATRY & NEUROLOGY

## 2021-10-27 PROCEDURE — 99214 OFFICE O/P EST MOD 30 MIN: CPT | Performed by: PSYCHIATRY & NEUROLOGY

## 2021-10-27 PROCEDURE — G8417 CALC BMI ABV UP PARAM F/U: HCPCS | Performed by: PSYCHIATRY & NEUROLOGY
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
## 2021-11-18 ENCOUNTER — INITIAL CONSULT (OUTPATIENT)
Dept: PAIN MANAGEMENT | Age: 36
End: 2021-11-18
Payer: MEDICARE

## 2021-11-18 VITALS
WEIGHT: 204.6 LBS | DIASTOLIC BLOOD PRESSURE: 86 MMHG | HEIGHT: 62 IN | BODY MASS INDEX: 37.65 KG/M2 | HEART RATE: 108 BPM | OXYGEN SATURATION: 93 % | SYSTOLIC BLOOD PRESSURE: 122 MMHG

## 2021-11-18 DIAGNOSIS — M51.36 DDD (DEGENERATIVE DISC DISEASE), LUMBAR: ICD-10-CM

## 2021-11-18 DIAGNOSIS — G89.29 CHRONIC BILATERAL LOW BACK PAIN WITH BILATERAL SCIATICA: Primary | ICD-10-CM

## 2021-11-18 DIAGNOSIS — Z79.899 CHRONIC PRESCRIPTION BENZODIAZEPINE USE: ICD-10-CM

## 2021-11-18 DIAGNOSIS — M54.42 CHRONIC BILATERAL LOW BACK PAIN WITH BILATERAL SCIATICA: Primary | ICD-10-CM

## 2021-11-18 DIAGNOSIS — M54.2 CHRONIC NECK PAIN: ICD-10-CM

## 2021-11-18 DIAGNOSIS — Z98.1 HX OF FUSION OF CERVICAL SPINE: ICD-10-CM

## 2021-11-18 DIAGNOSIS — M54.41 CHRONIC BILATERAL LOW BACK PAIN WITH BILATERAL SCIATICA: Primary | ICD-10-CM

## 2021-11-18 DIAGNOSIS — G89.29 CHRONIC NECK PAIN: ICD-10-CM

## 2021-11-18 PROCEDURE — G8417 CALC BMI ABV UP PARAM F/U: HCPCS | Performed by: ANESTHESIOLOGY

## 2021-11-18 PROCEDURE — G8427 DOCREV CUR MEDS BY ELIG CLIN: HCPCS | Performed by: ANESTHESIOLOGY

## 2021-11-18 PROCEDURE — G8484 FLU IMMUNIZE NO ADMIN: HCPCS | Performed by: ANESTHESIOLOGY

## 2021-11-18 PROCEDURE — 99244 OFF/OP CNSLTJ NEW/EST MOD 40: CPT | Performed by: ANESTHESIOLOGY

## 2021-11-18 RX ORDER — LURASIDONE HYDROCHLORIDE 40 MG/1
TABLET, FILM COATED ORAL
COMMUNITY
Start: 2021-10-07

## 2021-11-18 RX ORDER — CLONAZEPAM 1 MG/1
TABLET ORAL
COMMUNITY
Start: 2021-11-03

## 2021-11-18 ASSESSMENT — ENCOUNTER SYMPTOMS
COUGH: 1
WHEEZING: 1
SHORTNESS OF BREATH: 1
BACK PAIN: 1
SINUS PRESSURE: 1
GASTROINTESTINAL NEGATIVE: 1
ALLERGIC/IMMUNOLOGIC NEGATIVE: 1
SINUS PAIN: 1
EYES NEGATIVE: 1
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
## 2021-12-03 ENCOUNTER — TELEPHONE (OUTPATIENT)
Dept: NEUROSURGERY | Age: 36
End: 2021-12-03

## 2022-02-10 ENCOUNTER — OFFICE VISIT (OUTPATIENT)
Dept: NEUROLOGY | Age: 37
End: 2022-02-10
Payer: MEDICARE

## 2022-02-10 VITALS
DIASTOLIC BLOOD PRESSURE: 83 MMHG | SYSTOLIC BLOOD PRESSURE: 136 MMHG | WEIGHT: 205 LBS | HEART RATE: 109 BPM | HEIGHT: 62 IN | TEMPERATURE: 97.1 F | BODY MASS INDEX: 37.73 KG/M2

## 2022-02-10 DIAGNOSIS — M54.16 LUMBAR RADICULOPATHY: Primary | ICD-10-CM

## 2022-02-10 PROCEDURE — 4004F PT TOBACCO SCREEN RCVD TLK: CPT | Performed by: PSYCHIATRY & NEUROLOGY

## 2022-02-10 PROCEDURE — 99214 OFFICE O/P EST MOD 30 MIN: CPT | Performed by: PSYCHIATRY & NEUROLOGY

## 2022-02-10 PROCEDURE — G8484 FLU IMMUNIZE NO ADMIN: HCPCS | Performed by: PSYCHIATRY & NEUROLOGY

## 2022-02-10 PROCEDURE — G8427 DOCREV CUR MEDS BY ELIG CLIN: HCPCS | Performed by: PSYCHIATRY & NEUROLOGY

## 2022-02-10 PROCEDURE — G8417 CALC BMI ABV UP PARAM F/U: HCPCS | Performed by: PSYCHIATRY & NEUROLOGY

## 2022-02-10 RX ORDER — PREGABALIN 50 MG/1
50 CAPSULE ORAL 2 TIMES DAILY
Qty: 60 CAPSULE | Refills: 2 | Status: SHIPPED | OUTPATIENT
Start: 2022-02-10 | End: 2022-06-01

## 2022-02-10 NOTE — PROGRESS NOTES
pain.  She admits to pain in her hips that radiates down her lower extremities bilaterally. She explains that her toes hurt. MRI Cervical Spine WO Contrast on 2014: Status post anterior cervical spine fusion noted at C4-C6 levels. Small central disc protrusion noted at C6-C7 level resulting in mild deformity of the ventral thecal sac. MRI Cervical Spine W WO Contrast 21: Anterior fixation from X9-G2 without complication. Multilevel degenerative change with canal stenosis most pronounced at C5-6 and C6-7. Moderate to severe right foraminal narrowing and mild left foraminal narrowing at C3-4 as well as mild bilateral foraminal narrowing at C5-6. MRI Lumbar Spine W WO Contrast 21: Degenerative change at the lumbosacral junction with mild bilateral foraminal narrowing and narrowing of the lateral recesses.         Current medication Dosage   Zanaflex 4 mg   Mobic 15 mg   Ibuprofen 200 mg         Previous medications Reason for discontinuation   Gabapentin Lack of efficacy   Lyrica Lack of efficacy                       Past Medical History:   Diagnosis Date    Cervical radiculopathy 2014    Chronic bilateral low back pain with bilateral sciatica 2021    H/O ganglion cyst     Herniated disc, cervical     c4-5, 5-6,6-7    Migraine     Nephrolithiasis     passes on own    Numbness and tingling     in different degrees all extremities     Past Surgical History:   Procedure Laterality Date    CERVICAL DISCECTOMY  14    with fusion C4-5, C5-6     SECTION      x3    WRIST GANGLION EXCISION Left      Allergies   Allergen Reactions    Norco [Hydrocodone-Acetaminophen] Hives     Family History   Problem Relation Age of Onset    Cancer Mother         breast      Social History     Socioeconomic History    Marital status: Single     Spouse name: Not on file    Number of children: 3    Years of education: Not on file    Highest education level: Not on file   Occupational History     Employer: N/A   Tobacco Use    Smoking status: Current Every Day Smoker     Packs/day: 0.50     Years: 10.00     Pack years: 5.00     Types: Cigarettes    Smokeless tobacco: Never Used   Vaping Use    Vaping Use: Never used   Substance and Sexual Activity    Alcohol use: No    Drug use: No    Sexual activity: Yes     Partners: Male   Other Topics Concern    Not on file   Social History Narrative    Not on file     Social Determinants of Health     Financial Resource Strain: High Risk    Difficulty of Paying Living Expenses: Very hard   Food Insecurity: No Food Insecurity    Worried About Running Out of Food in the Last Year: Never true    Karlee of Food in the Last Year: Never true   Transportation Needs:     Lack of Transportation (Medical): Not on file    Lack of Transportation (Non-Medical): Not on file   Physical Activity:     Days of Exercise per Week: Not on file    Minutes of Exercise per Session: Not on file   Stress:     Feeling of Stress : Not on file   Social Connections:     Frequency of Communication with Friends and Family: Not on file    Frequency of Social Gatherings with Friends and Family: Not on file    Attends Scientologist Services: Not on file    Active Member of [address] [place] [address] or Organizations: Not on file    Attends Club or Organization Meetings: Not on file    Marital Status: Not on file   Intimate Partner Violence:     Fear of Current or Ex-Partner: Not on file    Emotionally Abused: Not on file    Physically Abused: Not on file    Sexually Abused: Not on file   Housing Stability:     Unable to Pay for Housing in the Last Year: Not on file    Number of [place] in the Last Year: Not on file    Unstable Housing in the Last Year: Not on file      There were no vitals taken for this visit.      HEENT [] Hearing Loss  [] Visual Disturbance  [] Tinnitus  [] Eye pain   Respiratory [] Shortness of Breath  [] Cough  [] Snoring   Cardiovascular [] Chest Pain  [] Palpitations  [] Lightheaded   GI [x] Constipation  [] Diarrhea  [] Swallowing change  [] Nausea/vomiting    [] Urinary Frequency  [] Urinary Urgency   Musculoskeletal [x] Neck pain  [x] Back pain  [] Muscle pain  [] Restless legs   Dermatologic [] Skin changes   Neurologic [] Memory loss/confusion  [] Seizures  [x] Trouble walking or imbalance  [x] Dizziness  [] Sleep disturbance  [x] Weakness  [x] Numbness  [] Tremors  [] Speech Difficulty  [x] Headaches  [x] Light Sensitivity  [x] Sound Sensitivity   Endocrinology []Excessive thirst  []Excessive hunger   Psychiatric [] Anxiety/Depression  [] Hallucination   Allergy/immunology []Hives/environmental allergies   Hematologic/lymph [] Abnormal bleeding  [] Abnormal bruising       General appearence: Normal. Well groomed.  In no acute distress    Head: Normocephalic, atraumatic  Eyes: Extraocular movements intact, eye lids normal  Lungs: Respirations unlabored, chest wall no deformity  ENT: Normal external ear canals, no sinus tenderness  Heart: Regular rate rhythm  Abdomen: No masses, tenderness  Extremities: No cyanosis or edema, 2+ pulses  Musculoskeletal: Normal range of motion in all joints  Skin: Intact, normal skin color    Neurological examination:    Mental status   Alert and oriented; intact memory with no confusion, speech or language problems; no hallucinations or delusions     Cranial nerves   II - visual fields intact to confrontation                                                III, IV, VI - extra-ocular muscles full: no pupillary defect; no ALONA, no nystagmus, no ptosis   V - normal facial sensation                                                               VII - normal facial symmetry                                                             VIII - intact hearing                                                                             IX, X - symmetrical palate                                                                  XI - symmetrical shoulder shrug                                                       XII - midline tongue without atrophy or fasciculation     Motor function  Normal muscle bulk and tone; normal power 5/5, including fine motor movements     Sensory function Intact to touch, pin, vibration, proprioception     Cerebellar Intact fine motor movement. No involuntary movements or tremors     Reflex function Intact 2+ DTR and symmetric. Negative Babinski     Gait                  Normal station and gait       No results found for: LDLCALC, LDLCHOLESTEROL, LDLDIRECT  No components found for: CHLPL  No results found for: TRIG  No results found for: HDL  No results found for: LDLCALC  No results found for: LABVLDL  No results found for: LABA1C  No results found for: EAG  Lab Results   Component Value Date    [id number] 376 01/13/2015      Neurological work up:  MRI Cervical Spine WO Contrast on 07/11/2014: Status post anterior cervical spine fusion noted at C4-C6 levels. Small central disc protrusion noted at C6-C7 level resulting in mild deformity of the ventral thecal sac. All of patient's labs were personally reviewed. All the imaging studies were personally reviewed and discussed with the patient. Assessment Recommendations:  Radicular pain in right C6/C7 dermatome  Significant neuropathic pain involving bilateral upper and lower extremities    Patient continues to experience severe cervical and lumbar pain. I recommended to increase the Lyrica to 50 mg twice a day. I also recommended therapeutic injections. Patient's last EMG was in 2015. I advised the patient to pursue a repeat EMG. All medication side effects were discussed and questions answered. Patient to follow up in 3 months or sooner if symptoms worsen. Wing Garcia MD I would like to thank you for the consult. Please do not hesitate if you have any questions about the patient care.        This note is created with the assistance of a speech-recognition program. While intending to generate a document that actually reflects the content of the visit, the document can still have some errors including those of syntax and sound a- like substitutions which may escape proofreading. In such instances, actual meaning can be extrapolated by contextual derivation. Scribe Attestation:   By signing my name below, Marisela Reddy, attest that this documentation has been prepared under the direction and in the presence of Sonam Smith MD.    Electronically Signed: Alex Lowe 6. 02/10/22. 11:15 AM    Physician Attestation:  Franca Ashton MD, personally performed the services described in this documentation. All medical record entries made by the scribe were at my direction and in my presence. I have reviewed the chart and discharge instructions (if applicable) and agree that the record reflects my personal performance and is accurate and complete.     Electronically Signed: Charles Zhao 2/10/2022 11:11 AM    Diplomate, American Board of Psychiatry and Neurology  Diplomate, American Board of Clinical Neurophysiology  Diplomate, American Board of Epilepsy

## 2022-04-14 ENCOUNTER — NURSE TRIAGE (OUTPATIENT)
Dept: OTHER | Facility: CLINIC | Age: 37
End: 2022-04-14
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
## 2022-05-31 ENCOUNTER — OFFICE VISIT (OUTPATIENT)
Dept: NEUROLOGY | Age: 37
End: 2022-05-31
Payer: MEDICARE

## 2022-05-31 VITALS
DIASTOLIC BLOOD PRESSURE: 87 MMHG | BODY MASS INDEX: 37.36 KG/M2 | SYSTOLIC BLOOD PRESSURE: 128 MMHG | WEIGHT: 203 LBS | HEIGHT: 62 IN | HEART RATE: 112 BPM

## 2022-05-31 DIAGNOSIS — M54.16 LUMBAR RADICULOPATHY: ICD-10-CM

## 2022-05-31 PROCEDURE — G8427 DOCREV CUR MEDS BY ELIG CLIN: HCPCS | Performed by: PSYCHIATRY & NEUROLOGY

## 2022-05-31 PROCEDURE — G8417 CALC BMI ABV UP PARAM F/U: HCPCS | Performed by: PSYCHIATRY & NEUROLOGY

## 2022-05-31 PROCEDURE — 99214 OFFICE O/P EST MOD 30 MIN: CPT | Performed by: PSYCHIATRY & NEUROLOGY

## 2022-05-31 PROCEDURE — 4004F PT TOBACCO SCREEN RCVD TLK: CPT | Performed by: PSYCHIATRY & NEUROLOGY
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
## 2022-06-08 ENCOUNTER — TELEPHONE (OUTPATIENT)
Dept: NEUROLOGY | Age: 37
End: 2022-06-08

## 2022-06-08 NOTE — TELEPHONE ENCOUNTER
Harwood Prader called the office this morning asking if we could fax the PT order to [facility] therapy. Patient stated that they were unable to see the 5/31/22 order in [facility]. Order printed off and faxed to [phone].

## 2022-06-13 ENCOUNTER — HOSPITAL ENCOUNTER (OUTPATIENT)
Dept: PHYSICAL THERAPY | Facility: CLINIC | Age: 37
Setting detail: THERAPIES SERIES
Discharge: HOME | End: 2022-06-13
Payer: MEDICARE

## 2022-06-13 PROCEDURE — 97162 PT EVAL MOD COMPLEX 30 MIN: CPT
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
## 2022-06-13 NOTE — PLAN OF CARE
[] [facility] [place] [place]) [facility] &  Therapy  [address]  [phone]  F: [phone] [x] [address]  [facility]   [address]  P: [phone]  F: [phone] [] [address] &  Therapy  [address]  P: [phone]  F: [phone] [] [address]  P: [phone]  F: [phone] [] [address]  [address] [facility] [address]   [address]   [place]: [phone]  F: [phone]        Physical Therapy Plan of Care    Date:  2022  Patient: Erika Eugene  : 1985  MRN: [id number]  Physician: Ralph Momin MD                     Insurance: [employer] advantage  Medical Diagnosis: lumbar radiculopathy                  Rehab Codes: M54.17; M54.12; R51; R26.2; M62.81; R20.2; R29.3  Onset Date: 2011                     Next 's appt.: 22: neurosurgery; 22: neurology         Assessment:  The patient has a high pain level and affected standing posture that is forwardly placed in flexion and leaning to the left. The patient would benefit, if not already receiving, psychological assistance to help her deal with the pain. the patient is reluctant to move due to the pain. She is not able to attend aquatic therapy due to the distance it is located from her home and she is reliant on others for transportation. Therapy will work on gentle flexibility and strengthening for postural improvement and also manual therapy to try and improve symptoms and fascial restrictions     Problems:    [x]? ? Pain: 10/10 entire body  [x]? ? ROM: cervical, shoulders, spine, hips, LE  [x]? ? Strength: multiple areas of weakness  [x]? ? Function: 95% deficit per OSWESTRY  [x]?  Other: forward bent, leans to L;        STG: (to be met in 5 treatments)  1. ? Pain: reduce pain below 10/10 so she is able to stand for at least 5 minutes prior to sitting down  2. ? ROM: tolerate light flexibility exercises to address flexed posture  3. ? Strength: tolerate light core and hip LE exercises to support spine  4. ? Function: sit with improved posture  5. Patient to be independent with home exercise program as demonstrated by performance with correct form without cues. 6. Demonstrate Knowledge of fall prevention  LTG: (to be met in 10 treatments)  1.  pain below 8/10  2. Stand with improved posture      Patient goals: feel better     Rehab Potential:  []? Good  [x]? Fair  [x]? Poor    Suggested Professional Referral:  []? No  [x]? Yes: psych assistance  Barriers to Goal Achievement:  []? No  [x]? Yes: chronic pain  Domestic Concerns:  [x]? No  []? Yes:       Treatment Plan:  [x]? Therapeutic Exercise   75226               [x]? Therapeutic Activity  93009   [x]? Electrical Stimulation Unattended  22 [id number]   [x]? Manual Therapy  56801   [x]? Instruction in HEP         [x]? Cold/hotpack   [x]? Dry Needling, 3 or more muscles  79520            Frequency:  1-2 x/week for 10 visits         Electronically signed by: Phares Dandy, PT        Physician Signature:________________________________Date:__________________  By signing above or cosigning this note, I have reviewed this plan of care and certify a need for medically necessary rehabilitation services.      *PLEASE SIGN ABOVE AND FAX BACK ALL PAGES*

## 2022-06-20 ENCOUNTER — HOSPITAL ENCOUNTER (OUTPATIENT)
Dept: PHYSICAL THERAPY | Facility: CLINIC | Age: 37
Setting detail: THERAPIES SERIES
Discharge: HOME | End: 2022-06-20
Payer: MEDICARE

## 2022-06-20 PROCEDURE — 97110 THERAPEUTIC EXERCISES: CPT

## 2022-06-20 NOTE — FLOWSHEET NOTE
[] [address]  [address]  [phone]  F: [phone] [x] [address]  [facility] [address]   [address]  P: [phone]  F: [phone] [] [place] &  Therapy  [address]  P: [phone]  F: [phone] [] [address]  P: [phone]  F: [phone] [] [address]  [facility]   [address]   [place]: [phone]  F: [phone]      Physical Therapy Daily Treatment Note    Date:  2022  Patient Name:  Pedro Kelley    :  1985  MRN: [id number]  Physician: Macey Escamilla MD                     Insurance: [employer] advantage  Medical Diagnosis: lumbar radiculopathy                  Rehab Codes: M54.17; M54.12; D00; R26.2; M62.81; R20.2; R29.3  Onset Date:                      Next 's appt.: 22: neurosurgery; 22: neurology  Visit# / total visits: 2/10     Cancels/No Shows:     Subjective:    Pain:  [x] Yes  [] No Location: LBP  Pain Rating: (0-10 scale) 10+/10  Pain altered Tx:  [] No  [x] Yes  Action: seated ex's- gentle stretches  Comments: Pt arrives 7 minutes late stating she has to leave early d/t pain management appt at 11 am. Presents with N/T down bilateral UE's and LE's. Notes feeling some relief when laying on her left side.      Objective:  Modalities:   Precautions:  Exercises:  Exercise bilat  Reps/ Time Weight/ Level Comments   Seated       Scap squeezes 10x     Chin tucks  10x     Tree hug to good mornings 10x     Cervical ROM 5x10\" ea  Side bend, rotation    Postural awareness edu x     Sciatic ns glides 5x  Small ROM for LAQ   Cane flexion  10x 1# cane     TrA activation  8x3\"     Hs stretch  3x10\" Manual     Hip IR/ER stretch  5x5\" Manual           Supine declines     Other: notes increased pain and migraine sx's when in supine also mentions she does not tolerate prone well 6/20      Specific Instructions for next treatment: work on posture, body mechanics, light spine, hip, shoulder ROM. Sanket Bartholomew to do manual     Treatment Charges: Mins Units   []  Modalities     [x]  Ther Exercise 35 2   []  Manual Therapy     []  Ther Activities     []  Aquatics     []  Vasocompression     []  Other     Total Treatment time 35 2       Assessment: [x] Progressing toward goals. Instructed through TrA activations to improve core stability. Encouraged to ensure she is breathing during exercises and for relaxation of musculature. Limited cervical ROM noticed this date. Completed all stretches/exercises slow and controlled with cues for positioning as needed. Pt tends to shift forward and to the left especially when she closes her eyes needing cues for an upright posture. Hip stretches completed manually in seated to improve mobility. Educated on being posturally aware and exercises to help address deficits. Pt notes she is tired post tx. Will monitor sx's and progress as able. [] No change. [x] Other: Pt mentions she is interested in cryotherapy and would like exercises to complete in her pool at home. Reports she tends to focus better with her eyes closed, however, increased trunk flex + L lateral lean noticed with this  [x] Patient would continue to benefit from skilled physical therapy services in order to: work on gentle flexibility and strengthening for postural improvement and also manual therapy to try and improve symptoms and fascial restrictions    STG/LTG  Problems:    [x]? ? Pain: 10/10 entire body  [x]? ? ROM: cervical, shoulders, spine, hips, LE  [x]? ? Strength: multiple areas of weakness   [x]? ? Function: 95% deficit per OSWESTRY  [x]?  Other: forward bent, leans to L;        STG: (to be met in 5 treatments)  1. ? Pain: reduce pain below 10/10 so she is able to stand for at least 5 minutes prior to sitting down  2. ? ROM: tolerate light flexibility exercises to address flexed posture  3. ? Strength: tolerate light core and hip LE exercises to support spine  4. ? Function: sit with improved posture  5. Patient to be independent with home exercise program as demonstrated by performance with correct form without cues. 6. Demonstrate Knowledge of fall prevention  LTG: (to be met in 10 treatments)  1.  pain below 8/10  2. Stand with improved posture      Patient goals: feel better  Pt. Education:  [x] Yes  [] No  [] Reviewed Prior HEP/Ed  Method of Education: [x] Verbal postural awareness, TrA  [] Demo  [] Written  Comprehension of Education:  [x] Verbalizes understanding. [] Demonstrates understanding. [] Needs review. [] Demonstrates/verbalizes HEP/Ed previously given. Plan: [x] Continue current frequency toward long and short term goals.     [] Specific Instructions for subsequent treatments: pool HEP and home stretches       Time In: 10:10          Time Out: 10:45    Electronically signed by:  Camron Sierra PTA

## 2022-06-21 ENCOUNTER — HOSPITAL ENCOUNTER (OUTPATIENT)
Dept: CT IMAGING | Age: 37
Discharge: HOME | End: 2022-06-23
Payer: MEDICARE

## 2022-06-21 DIAGNOSIS — Z98.1 HISTORY OF FUSION OF CERVICAL SPINE: ICD-10-CM

## 2022-06-21 DIAGNOSIS — M47.12 CERVICAL SPONDYLOSIS WITH MYELOPATHY: ICD-10-CM

## 2022-06-21 PROCEDURE — 72125 CT NECK SPINE W/O DYE: CPT

## 2022-06-23 ENCOUNTER — HOSPITAL ENCOUNTER (OUTPATIENT)
Dept: PHYSICAL THERAPY | Facility: CLINIC | Age: 37
Setting detail: THERAPIES SERIES
Discharge: HOME | End: 2022-06-23
Payer: MEDICARE

## 2022-06-23 PROCEDURE — 97140 MANUAL THERAPY 1/> REGIONS: CPT

## 2022-06-23 NOTE — FLOWSHEET NOTE
[] [address]  [address]  [phone]  F: [phone] [x] [address]  [facility] [address]   [address]  P: [phone]  F: [phone] [] [place] &  Therapy  [address]  P: [phone]  F: [phone] [] [address]  P: [phone]  F: [phone] [] [address]  [facility]   [address]   [place]: [phone]  F: [phone]      Physical Therapy Daily Treatment Note    Date:  2022  Patient Name:  Tg Reynaga    :  1985  MRN: [id number]  Physician: Shamika Palomares MD                     Insurance: [employer] advantage  Medical Diagnosis: lumbar radiculopathy                  Rehab Codes: M54.17; M54.12; R12; R26.2; M62.81; R20.2; R29.3  Onset Date:                      Next 's appt.: 22: neurosurgery; 22: neurology  Visit# / total visits: 3/10     Cancels/No Shows:     Subjective:    Pain:  [x] Yes  [] No Location: LBP  Pain Rating: (0-10 scale) 10+/10  Pain altered Tx:  [] No  [x] Yes  Action: seated ex's- gentle stretches  Comments: pt reports it was painful to do exercises and was \"stretching a lot\". Pt not sleeping well. Pt has 8 puppies and 4 other dogs at home right now.    Objective:  Modalities:   Precautions:  Exercises: bolded 22   Exercise bilat  Reps/ Time Weight/ Level Comments   Seated       Scap squeezes 10x     Chin tucks  10x     Tree hug to good mornings 10x     Cervical ROM 5x10\" ea  Side bend, rotation    Postural awareness edu x     Sciatic ns glides 5x  Small ROM for LAQ   Cane flexion  10x 1# cane     TrA activation  8x3\"     Hs stretch  3x10\" Manual     Hip IR/ER stretch  5x5\" Manual           Supine  declines     Other: notes increased pain and migraine sx's when in supine also mentions she does not tolerate prone well   Manual therapy: 22: supine with head elevated. Fascial listening revealed tightness anteriorly. Release of R sciatic nn x 1; anterior parietal peritoneum release; various fascial restrictions released including towards  scar. Release of sacrum with patient in same position as therapist was able. Standing/leaning over table: release of posterior parietal peritoneum      Specific Instructions for next treatment: work on posture, body mechanics, light spine, hip, shoulder ROM. Opal Generous to do manual     Treatment Charges: Mins Units   []  Modalities     []  Ther Exercise     [x]  Manual Therapy 48 3   []  Ther Activities     []  Aquatics     []  Vasocompression     []  Other     Total Treatment time 48 3       Assessment: [x] Progressing toward goals. Pt with poor tolerance to supine lying. Pain with changing position. Will have to do treatment in side lying or semiseated next visit. Good release obtained but pain increased after getting up from bed    [] No change. [x] Other: Pt mentions she is interested in cryotherapy and would like exercises to complete in her pool at home. Reports she tends to focus better with her eyes closed, however, increased trunk flex + L lateral lean noticed with this  [x] Patient would continue to benefit from skilled physical therapy services in order to: work on gentle flexibility and strengthening for postural improvement and also manual therapy to try and improve symptoms and fascial restrictions    STG/LTG  Problems:    [x]? ? Pain: 10/10 entire body  [x]? ? ROM: cervical, shoulders, spine, hips, LE  [x]? ? Strength: multiple areas of weakness   [x]? ? Function: 95% deficit per OSWESTRY  [x]?  Other: forward bent, leans to L;        STG: (to be met in 5 treatments)  1. ? Pain: reduce pain below 10/10 so she is able to stand for at least 5 minutes prior to sitting down  2. ? ROM: tolerate light flexibility exercises to address flexed posture  3. ? Strength: tolerate light core and hip LE exercises to support spine  4. ? Function: sit with improved posture  5. Patient to be independent with home exercise program as demonstrated by performance with correct form without cues. 6. Demonstrate Knowledge of fall prevention  LTG: (to be met in 10 treatments)  1.  pain below 8/10  2. Stand with improved posture      Patient goals: feel better  Pt. Education:  [] Yes  [x] No  [] Reviewed Prior HEP/Ed  Method of Education: [] Verbal postural awareness, TrA  [] Demo  [] Written  Comprehension of Education:  [] Verbalizes understanding. [] Demonstrates understanding. [] Needs review. [] Demonstrates/verbalizes HEP/Ed previously given. Plan: [x] Continue current frequency toward long and short term goals.     [x] Specific Instructions for subsequent treatments: pool HEP and home stretches       Time In: 9:30 am         Time Out: 10:30 am    Electronically signed by:  Dylan Ríos PT

## 2022-06-27 ENCOUNTER — OFFICE VISIT (OUTPATIENT)
Dept: NEUROSURGERY | Age: 37
End: 2022-06-27
Payer: MEDICARE

## 2022-06-27 VITALS
DIASTOLIC BLOOD PRESSURE: 75 MMHG | OXYGEN SATURATION: 96 % | BODY MASS INDEX: 37.54 KG/M2 | SYSTOLIC BLOOD PRESSURE: 111 MMHG | WEIGHT: 204 LBS | HEART RATE: 108 BPM | HEIGHT: 62 IN

## 2022-06-27 DIAGNOSIS — M47.816 LUMBAR SPONDYLOSIS: ICD-10-CM

## 2022-06-27 DIAGNOSIS — M47.12 CERVICAL SPONDYLOSIS WITH MYELOPATHY: Primary | ICD-10-CM

## 2022-06-27 DIAGNOSIS — G56.23 ULNAR NEUROPATHY OF BOTH UPPER EXTREMITIES: ICD-10-CM

## 2022-06-27 DIAGNOSIS — G56.03 BILATERAL CARPAL TUNNEL SYNDROME: ICD-10-CM

## 2022-06-27 PROCEDURE — 99214 OFFICE O/P EST MOD 30 MIN: CPT | Performed by: NURSE PRACTITIONER

## 2022-06-27 PROCEDURE — G8427 DOCREV CUR MEDS BY ELIG CLIN: HCPCS | Performed by: NURSE PRACTITIONER

## 2022-06-27 PROCEDURE — 4004F PT TOBACCO SCREEN RCVD TLK: CPT | Performed by: NURSE PRACTITIONER

## 2022-06-27 PROCEDURE — G8417 CALC BMI ABV UP PARAM F/U: HCPCS | Performed by: NURSE PRACTITIONER

## 2022-06-27 NOTE — PROGRESS NOTES
[address]  [facility] [address], [address]  Dept: [phone]    Patient:  Mabel Gaucher  YOB: 1985  Date: 22    The patient is a 40 y.o. female who presents today for consult of the following problems:     Chief Complaint   Patient presents with    Follow-up    Results         HPI:     Mabel Gaucher is a 40 y.o. female who presents for follow-up of neck and arm symptoms. Patient has history significant for C4-C6 fusion in . Patient has had ongoing issues with diffuse axial neck and back pain. Also with numbness and tingling to bilateral arms, frequently wakes her up at night. Associated numbness and tingling to both hands diffusely. Does have difficulty with dexterity, dropping objects. Pain significantly limiting and interferes with quality of living. Patient was last seen in September, was referred for physical therapy at that time. Has not been able to start until just recently, has completed approximately 3 sessions thus far. Also following with neurology, was referred for EMG, has not yet scheduled this.       History:     Past Medical History:   Diagnosis Date    Cervical radiculopathy 2014    Chronic bilateral low back pain with bilateral sciatica 2021    H/O ganglion cyst     Herniated disc, cervical     c4-5, 5-6,6-7    Migraine     Nephrolithiasis     passes on own    Numbness and tingling     in different degrees all extremities     Past Surgical History:   Procedure Laterality Date    CERVICAL DISCECTOMY  14    with fusion C4-5, C5-6     SECTION      x3    WRIST GANGLION EXCISION Left      Family History   Problem Relation Age of Onset    Cancer Mother         breast     Current Outpatient Medications on File Prior to Visit   Medication Sig Dispense Refill    tiZANidine (ZANAFLEX) 4 MG tablet Take 1 tablet by mouth every 8 hours as needed (muscle spasms) 90 tablet 1    silver sulfADIAZINE (SILVADENE) 1 % cream Apply topically daily. 50 g 0    ibuprofen (ADVIL;MOTRIN) 800 MG tablet Take 1 tablet by mouth 3 times daily (with meals) 90 tablet 0    lidocaine (XYLOCAINE) 5 % ointment Apply topically as needed. 30 g 1    clonazePAM (KLONOPIN) 1 MG tablet       LATUDA 40 MG TABS tablet       ibuprofen (ADVIL;MOTRIN) 200 MG tablet Take 200 mg by mouth every 6 hours as needed for Pain      Menthol, Topical Analgesic, (BIOFREEZE EX) Apply topically as needed      medroxyPROGESTERone (DEPO-PROVERA) 150 MG/ML injection Inject 1 mL into the muscle once for 1 dose 1 mL 1     No current facility-administered medications on file prior to visit. Social History     Tobacco Use    Smoking status: Current Every Day Smoker     Packs/day: 1.00     Years: 15.00     Pack years: 15.00     Types: Cigarettes     Start date: 2004     Last attempt to quit: 2021     Years since quittin.4    Smokeless tobacco: Never Used    Tobacco comment: Nothing   Vaping Use    Vaping Use: Never used   Substance Use Topics    Alcohol use: No    Drug use: No       Allergies   Allergen Reactions    Norco [Hydrocodone-Acetaminophen] Hives       Review of Systems  Constitutional: Negative for activity change and appetite change. HENT: Negative for ear pain and facial swelling. Eyes: Negative for discharge and itching. Respiratory: Negative for choking and chest tightness. Cardiovascular: Negative for chest pain and leg swelling. Gastrointestinal: Negative for nausea and abdominal pain. Endocrine: Negative for cold intolerance and heat intolerance. Genitourinary: Negative for frequency and flank pain. Musculoskeletal: Negative for myalgias and joint swelling. Skin: Negative for rash and wound. Allergic/Immunologic: Negative for environmental allergies and food allergies.    Hematological: Negative for adenopathy. Does not bruise/bleed easily. Psychiatric/Behavioral: Negative for self-injury. The patient is not nervous/anxious. Physical Exam:      /75   Pulse (!) 108   Ht 5' 2\" (1.575 m)   Wt 204 lb (92.5 kg)   SpO2 96%   BMI 37.31 kg/m²   Estimated body mass index is 37.31 kg/m² as calculated from the following:    Height as of this encounter: 5' 2\" (1.575 m). Weight as of this encounter: 204 lb (92.5 kg). General:  Kemi Rodas is a 40y.o. year old female who appears her stated age. HEENT: Normocephalic atraumatic. Neck supple. Chest: regular rate; pulses equal  Abdomen: Soft nontender nondistended.   Ext: DP and PT pulses 2+, good cap refill  Neuro    Mentation  Appropriate affect  Registration intact  Orientation intact  Judgement intact to situation    Cranial Nerves:   Pupils equal and reactive to light  Extraocular motion intact  Face and shrug symmetric  Tongue midline  No dysarthria  v1-3 sensation symmetric, masseter tone symmetric  Hearing symmetric    Sensation: Decreased lateral aspect of both hands    Motor  L deltoid 5/5; R deltoid 5/5  L biceps 5/5; R biceps 5/5  L triceps 5/5; R triceps 5/5  L wrist extension 5/5; R wrist extension 5/5  L intrinsics 5/5; R intrinsics 5/5     L iliopsoas 5/5 , R iliopsoas 5/5  L quadriceps 5/5; R quadriceps 5/5  L Dorsiflexion 5/5; R dorsiflexion 5/5  L Plantarflexion 5/5; R plantarflexion 5/5  L EHL 5/5; R EHL 5/5    Reflexes  L Brachioradialis 2+/4; R brachioradialis 2+/4  L Biceps 2+/4; R Biceps 2+/4  L Triceps 2+/4; R Triceps 2+/4  L Patellar 2+/4: R Patellar 2+/4  L Achilles 2+/4; R Achilles 2+/4    hoffmans L: neg  hoffmans R: neg  Clonus L: neg  Clonus R: neg  Babinski L: neg  Babinski R: neg    + Tinel's and Phalen's bilaterally in both median and ulnar distribution  Negative ring finger split    Studies Review:     CT cervical spine 6/21/2022:  FINDINGS:   BONES/ALIGNMENT: There is no acute fracture or traumatic malalignment. Straightening of the normal lordosis.  Anterior cervical discectomy and   fusion spanning C4 through C6.  The hardware appears intact without   surrounding lucency.  There is solid mature osseous fusion at C4-C5 across   the disc spacer.  There is only minimal partial ankylosis across the anterior   margin of the disc spacer at C5-C6.       DEGENERATIVE CHANGES: Mild disc height loss at C3-C4 and C6-C7.  Mild   uncovertebral spurring at C3-C4 and to a greater degree at C5-C6 with   resultant mild to moderate osseous neuroforaminal narrowing.  Spinal canal   appears grossly patent.       SOFT TISSUES: There is no prevertebral soft tissue swelling.  Nonspecific   prominent right level 2 lymph nodes, unchanged from remote prior imaging.         X-ray cervical spine 9/3/2021:  FINDINGS:   Lateral flexion and extension radiographs were performed. Nichelle Robertson is no   listhesis evident.  The sequelae of anterior cervical discectomy and fusion   from C4-C6 is noted.  Alignment is normal.  There is no disruption of the   orthopedic hardware identified.  Alignment of the hardware is unchanged from   the previous exam.  The prevertebral soft tissues are normal.  There is no   acute fracture identified. Neurology notes reviewed    Assessment and Plan:      1. Cervical spondylosis with myelopathy    2. Ulnar neuropathy of both upper extremities    3. Bilateral carpal tunnel syndrome    4. Lumbar spondylosis         Plan: Patient fitted with bilateral wrist braces to start wearing nocturnally. Recommend proceeding with outpatient EMG as planned. Continue physical therapy measures as she is only been able to attend 3 sessions so far. Recommend follow-up in approximately 10 weeks for reevaluation peer    Followup: Return in about 10 weeks (around 9/5/2022), or if symptoms worsen or fail to improve. Prescriptions Ordered:  No orders of the defined types were placed in this encounter.      Orders Placed:  No orders of the defined types were placed in this encounter. Electronically signed by AYE Vegas CNP on 6/27/2022 at 2:24 PM    Please note that this chart was generated using voice recognition Dragon dictation software. Although every effort was made to ensure the accuracy of this automated transcription, some errors in transcription may have occurred.

## 2022-07-05 ENCOUNTER — HOSPITAL ENCOUNTER (OUTPATIENT)
Dept: PHYSICAL THERAPY | Facility: CLINIC | Age: 37
Setting detail: THERAPIES SERIES
Discharge: HOME | End: 2022-07-05
Payer: MEDICARE

## 2022-07-05 PROCEDURE — 97110 THERAPEUTIC EXERCISES: CPT

## 2022-07-05 NOTE — FLOWSHEET NOTE
[] [facility] [address]  [address]  [phone]  F: [phone] [x] [address]  [employer] [address]   [address]  P: [phone]  F: [phone] [] [facility]  [address]  P: [phone]  F: [phone] [] [address]  P: [phone]  F: [phone] [] [address]  [facility]   [address]   [place]: [phone]  F: [phone]      Physical Therapy Daily Treatment Note    Date:  2022  Patient Name:  Tunde Moreno    :  1985  MRN: [id number]  Physician: Torrey Bustillo MD                     Insurance: paramount advantage  Medical Diagnosis: lumbar radiculopathy                  Rehab Codes: M54.17; M54.12; C98; R26.2; M62.81; R20.2; R29.3  Onset Date:                      Next 's appt.: 22: neurosurgery; 22: neurology  Visit# / total visits: 4/10     Cancels/No Shows: 0/1    Subjective:    Pain:  [x] Yes  [] No Location: LBP  Pain Rating: (0-10 scale) 6/10  Pain altered Tx:  [] No  [x] Yes  Action: seated ex's- gentle stretches  Comments: Pt states she got her license back is able to drive again using her friends car which has reduced her stress levels. Reports increased pain after driving. Continues to experience intermittent N/T down BLE's. Notes sinuses are bothering her this date. Mentions she received wrist splints bilaterally to use at night.      Objective:  Modalities:   Precautions:  Exercises: bolded 22   Exercise bilat  Reps/ Time Weight/ Level Comments   Seated       Scap squeezes 10x3\"     Chin tucks  10x     Rhomboid stretch  3x15\"  Hands in front clasped together    Cervical ROM 5x10\" ea  Side bend, rotation    Postural awareness edu x     Sciatic ns glides 5x  Small ROM for LAQ   Cane flexion, ER 10xea 1# cane     TrA activation  5x5\"     Hs stretch  3x10\"     Hip IR/ER stretch  5x5\" Manual     Physio ball roll outs  5x5\" ea blue PB Fwd, lateral    Heel/toe raise 10x     Hip abd  10x     Hip add 10x Ball           Supine  declines     Other: notes increased pain and migraine sx's when in supine also mentions she does not tolerate prone well   Manual therapy: 22: supine with head elevated. Fascial listening revealed tightness anteriorly. Release of R sciatic nn x 1; anterior parietal peritoneum release; various fascial restrictions released including towards  scar. Release of sacrum with patient in same position as therapist was able. Standing/leaning over table: release of posterior parietal peritoneum      Specific Instructions for next treatment: work on posture, body mechanics, light spine, hip, shoulder ROM. Zula Baseman to do manual     Treatment Charges: Mins Units   []  Modalities     [x]  Ther Exercise 50 3   []  Manual Therapy     []  Ther Activities     []  Aquatics     []  Vasocompression     []  Other     Total Treatment time 50 3       Assessment: [x] Progressing toward goals. Initiated session with stretches to full spine to increase ms flexibility. Physio ball roll outs added this date. Cues to ensure a comfortable, pain free range throughout session. Addition of hip add/abd exercises with good tolerance. Notes some pain near  with TrA activations. Instructed to talk with her physician about redness and increased swelling in B LE's. Issued written HEP at end of session, pt ensures understanding. Mentions a better tolerance to therapy session this date as she was in a seated position vs supine. [] No change. [x] Other: Pt mentions she is interested in cryotherapy and would like exercises to complete in her pool at home.  Reports she tends to focus better with her eyes closed, however, increased trunk flex + L lateral lean noticed with this  [x] Patient would continue to benefit from skilled physical therapy services in order to: work on gentle flexibility and strengthening for postural improvement and also manual therapy to try and improve symptoms and fascial restrictions    STG/LTG  Problems:    [x]? ? Pain: 10/10 entire body  [x]? ? ROM: cervical, shoulders, spine, hips, LE  [x]? ? Strength: multiple areas of weakness   [x]? ? Function: 95% deficit per OSWESTRY  [x]? Other: forward bent, leans to L;        STG: (to be met in 5 treatments)  1. ? Pain: reduce pain below 10/10 so she is able to stand for at least 5 minutes prior to sitting down  2. ? ROM: tolerate light flexibility exercises to address flexed posture  3. ? Strength: tolerate light core and hip LE exercises to support spine  4. ? Function: sit with improved posture  5. Patient to be independent with home exercise program as demonstrated by performance with correct form without cues. 6. Demonstrate Knowledge of fall prevention  LTG: (to be met in 10 treatments)  1.  pain below 8/10  2. Stand with improved posture      Patient goals: feel better    Pt. Education:  [] Yes  [x] No  [] Reviewed Prior HEP/Ed  Method of Education: [x] Verbal postural awareness, TrA  [x] Demo new stretches/ ex's [x] Written  Access Code: [email or web address]  URL: [email or web address]  Date: 07/05/2022  Exercises  Seated Scapular Retraction - 1 x daily - 5 x weekly - 2 sets - 10 reps  Seated Cervical Sidebending AROM - 1 x daily - 5 x weekly - 5 sets - 10sec hold  Seated Cervical Rotation AROM - 1 x daily - 5 x weekly - 5 sets - 10sec hold  Seated Hamstring Stretch - 1 x daily - 5 x weekly - 3 sets - 10 sec hold  Seated Heel Raise - 1 x daily - 5 x weekly - 2 sets - 10 reps  Forward and Backward Stepping at [employer] - 1 x daily - 5 x weekly - 1 sets - 10 reps  Seated Hip Abduction - 1 x daily - 5 x weekly - 2 sets - 10 reps  Seated Hip Adduction Isometrics with Ball - 1 x daily - 5 x weekly - 2 sets - 10 reps - 5 sec hold  Seated Transversus Abdominis Bracing - 1 x daily - 5 x weekly - 10 reps - 5 sec hold    Comprehension of Education:  [x] Verbalizes understanding. [x] Demonstrates understanding. [x] Needs review. For compliance  [] Demonstrates/verbalizes HEP/Ed previously given. Plan: [x] Continue current frequency toward long and short term goals.     [x] Specific Instructions for subsequent treatments:       Time In: 11:10 am         Time Out:  12:00 pm    Electronically signed by:  Orlin Reyna PTA

## 2022-07-07 ENCOUNTER — HOSPITAL ENCOUNTER (OUTPATIENT)
Dept: PHYSICAL THERAPY | Facility: CLINIC | Age: 37
Setting detail: THERAPIES SERIES
Discharge: HOME | End: 2022-07-07
Payer: MEDICARE

## 2022-07-07 PROCEDURE — 97140 MANUAL THERAPY 1/> REGIONS: CPT

## 2022-07-07 NOTE — FLOWSHEET NOTE
[] [facility] [address] [facility]  [address]  [phone]  F: [phone] [x] [address]  [facility]   [address]  P: [phone]  F: [phone] [] [facility]  [address] [facility]  P: [phone]  F: [phone] [] [address]  P: [phone]  F: [phone] [] [address]  [facility]   [address]   [place]: [phone]  F: [phone]      Physical Therapy Daily Treatment Note    Date:  2022  Patient Name:  Delmy Parker    :  1985  MRN: [id number]  Physician: Terry Larry MD                     Insurance: paramount advantage  Medical Diagnosis: lumbar radiculopathy                  Rehab Codes: M54.17; M54.12; L35; R26.2; M62.81; R20.2; R29.3  Onset Date:                      Next 's appt.:  neurosurgery 22; 22: neurology  Visit# / total visits: 5/10     Cancels/No Shows: 0/1    Subjective:    Pain:  [x] Yes  [] No Location: LBP  Pain Rating: (0-10 scale) 6/10  Pain altered Tx:  [x] No  [] Yes  Action:   Comments: after doing ex she feels worse. Woke up this morning and legs gave out. At neurosurgery she was given wrist bands and is to get an EMG. Hasn't scheduled yet. Pt with \"horrible feeling\" of entire spine from upper to lumbar. Pt fell last night at 4 am when she let the dogs out and her legs gave out and she fell on coccyx. Legs went numb to toes. Enters with numbness. Pt also felt sore after last manual session but by the next day symptoms improved. Objective: 22: standing posture: forward and L flexed with slight R rotation. Modalities:   Precautions: difficulty tolerating supine, do not put in prone.  Concentrate exercises in sitting/standing, maybe try 4-point position  Exercises: bolded 22   Exercise bilat  Reps/ Time Weight/ Level Comments   Seated       Scap squeezes 10x3\"     Chin tucks  10x     Rhomboid stretch  3x15\"  Hands in front clasped together    Cervical ROM 5x10\" ea  Side bend, rotation    Postural awareness edu x     Sciatic ns glides 5x  Small ROM for LAQ   Cane flexion, ER 10xea 1# cane     TrA activation  5x5\"     Hs stretch  3x10\"     Hip IR/ER stretch  5x5\" Manual     Physio ball roll outs  5x5\" ea blue PB Fwd, lateral    Heel/toe raise 10x     Hip abd  10x     Hip add 10x Ball           Supine  declines     Other: notes increased pain and migraine sx's when in supine also mentions she does not tolerate prone well   Manual therapy: side lying R then L with manual releases performed. Anterior parietal peritoneum, anterior scar release, B kidney/psoas organ specific fascial mobilization performed. Sigmoid release with pt in R side lying. L sacrotuberous ligament release. used hip extension movement as another way of determining area of tightness in lumbosacral spine, pelvis and hips. L side more restricted than R.      22: supine with head elevated. Fascial listening revealed tightness anteriorly. Release of R sciatic nn x 1; anterior parietal peritoneum release; various fascial restrictions released including towards  scar. Release of sacrum with patient in same position as therapist was able. Standing/leaning over table: release of posterior parietal peritoneum      Specific Instructions for next treatment: work on posture, body mechanics, light spine, hip, shoulder ROM. Theodor Lites to do manual     Treatment Charges: Mins Units   []  Modalities     []  Ther Exercise     [x]  Manual Therapy 50 3   []  Ther Activities     []  Aquatics     []  Vasocompression     []  Other     Total Treatment time 50 3       Assessment: [x] Progressing toward goals. No n/t at end of session.  Improved passive hip extension bilaterally after manual releases, however, the patient still claims LBP with hip extension stretch. able to achieve 0 degrees extension passively without resistance in tissues after all manual work. Still favors L side with increased flexion at waist in standing by end of session. Overall, however, somewhat improved standing posture since the start of therapy. [] No change. [x] Other: Pt mentions she is interested in cryotherapy and would like exercises to complete in her pool at home. Reports she tends to focus better with her eyes closed, however, increased trunk flex + L lateral lean noticed with this  [x] Patient would continue to benefit from skilled physical therapy services in order to: work on gentle flexibility and strengthening for postural improvement and also manual therapy to try and improve symptoms and fascial restrictions    Problems:    [x]? ? Pain: 10/10 entire body  [x]? ? ROM: cervical, shoulders, spine, hips, LE  [x]? ? Strength: multiple areas of weakness   [x]? ? Function: 95% deficit per OSWESTRY  [x]? Other: forward bent, leans to L;        STG: (to be met in 5 treatments)  1. ? Pain: reduce pain below 10/10 so she is able to stand for at least 5 minutes prior to sitting down: MET 7/7/22  2. ? ROM: tolerate light flexibility exercises to address flexed posture: MET 7/7/22  3. ? Strength: tolerate light core and hip LE exercises to support spine: MET 7/7/22  4. ? Function: sit with improved posture  5. Patient to be independent with home exercise program as demonstrated by performance with correct form without cues. ONGOING 7/7/22  6. Demonstrate Knowledge of fall prevention: ONGOING 7/7/22  LTG: (to be met in 10 treatments)  1.  pain below 8/10  2. Stand with improved posture      Patient goals: feel better    Pt.  Education:  [] Yes  [x] No  [] Reviewed Prior HEP/Ed  Method of Education: [x] Verbal postural awareness, TrA  [x] Demo new stretches/ ex's [x] Written  Access Code: [email or web address]  URL: [email or web address]  Date: 07/05/2022  Exercises  Seated Scapular Retraction - 1 x daily - 5 x weekly - 2 sets - 10 reps  Seated Cervical Sidebending AROM - 1 x daily - 5 x weekly - 5 sets - 10sec hold  Seated Cervical Rotation AROM - 1 x daily - 5 x weekly - 5 sets - 10sec hold  Seated Hamstring Stretch - 1 x daily - 5 x weekly - 3 sets - 10 sec hold  Seated Heel Raise - 1 x daily - 5 x weekly - 2 sets - 10 reps  Forward and Backward Stepping at [employer] - 1 x daily - 5 x weekly - 1 sets - 10 reps  Seated Hip Abduction - 1 x daily - 5 x weekly - 2 sets - 10 reps  Seated Hip Adduction Isometrics with Ball - 1 x daily - 5 x weekly - 2 sets - 10 reps - 5 sec hold  Seated Transversus Abdominis Bracing - 1 x daily - 5 x weekly - 10 reps - 5 sec hold    Comprehension of Education:  [] Verbalizes understanding. [] Demonstrates understanding. [x] Needs review. For compliance  [] Demonstrates/verbalizes HEP/Ed previously given. Plan: [x] Continue current frequency toward long and short term goals.     [x] Specific Instructions for subsequent treatments:       Time In: 11:15 am         Time Out:  12:05 pm    Electronically signed by:  Chalmer Gilford, PT

## 2022-07-12 ENCOUNTER — HOSPITAL ENCOUNTER (OUTPATIENT)
Dept: PHYSICAL THERAPY | Facility: CLINIC | Age: 37
Setting detail: THERAPIES SERIES
Discharge: HOME | End: 2022-07-12
Payer: MEDICARE

## 2022-07-12 PROCEDURE — 97110 THERAPEUTIC EXERCISES: CPT

## 2022-07-12 NOTE — FLOWSHEET NOTE
[x] [address] [facility]  Outpatient Rehabilitation &  Therapy  [facility] [address]   [address]  P: [phone]  F: [phone]     Physical Therapy Daily Treatment Note    Date:  2022  Patient Name:  Erika Ruano    :  1985  MRN: [id number]  Physician: Brook Gooden MD                     Insurance: paramount advantage  Medical Diagnosis: lumbar radiculopathy                  Rehab Codes: M54.17; M54.12; D43; R26.2; M62.81; R20.2; R29.3  Onset Date:                      Next 's appt.:  neurosurgery 22; 22: neurology  Visit# / total visits: 6/10     Cancels/No Shows: 0/1    Subjective:    Pain:  [x] Yes  [] No Location: LBP  Pain Rating: (0-10 scale) 6/10  Pain altered Tx:  [x] No  [] Yes  Action:   Comments: Patient arrives stating that she knows with stress her pain is worse. She has puppies at home and dealing with the dogs has increased her stress. She has to constantly bend and clean up after them which will strike her pain up to 10. Arrives with a 6/10 pain. States that she is back to driving and that also does irritate her low back. Objective: 22: standing posture: forward and L flexed with slight R rotation. Modalities:   Precautions: difficulty tolerating supine, do not put in prone.  Concentrate exercises in sitting/standing, maybe try 4-point position  Exercises: bolded 22   Exercise bilat  Reps/ Time Weight/ Level Comments   Seated       Scap squeezes 10x3\"     Chin tucks  10x     Rhomboid stretch  3x15\"  Hands in front clasped together    Cervical ROM 5x10\" ea  Side bend, rotation    Postural awareness edu x     Sciatic ns glides 5x  Small ROM for LAQ   Cane flexion, ER 10xea 1# cane     TrA activation  5x5\"     Hs stretch  3x10\"     Hip IR/ER stretch  5x5\" Manual     Physio ball roll outs  10x5\" ea blue PB Fwd, lateral    Heel/toe raise 10x     Hip abd  10x     Hip add 10x Ball     Seated piriformis stretch 3x10\"  Added 22 Seated on pball      PPT/APT 10x ea Blue pball Added 22   Seated march with TrA activation 10x ea  Added 22   TrA activation 5x5\"  Added 22                     Supine  declines     Other: notes increased pain and migraine sx's when in supine also mentions she does not tolerate prone well   Manual therapy: side lying R then L with manual releases performed. Anterior parietal peritoneum, anterior scar release, B kidney/psoas organ specific fascial mobilization performed. Sigmoid release with pt in R side lying. L sacrotuberous ligament release. used hip extension movement as another way of determining area of tightness in lumbosacral spine, pelvis and hips. L side more restricted than R.      22: supine with head elevated. Fascial listening revealed tightness anteriorly. Release of R sciatic nn x 1; anterior parietal peritoneum release; various fascial restrictions released including towards  scar. Release of sacrum with patient in same position as therapist was able. Standing/leaning over table: release of posterior parietal peritoneum    Specific Instructions for next treatment: work on posture, body mechanics, light spine, hip, shoulder ROM. Pau Abreu to do manual, improve core stability and activation    Treatment Charges: Mins Units   []  Modalities     [x]  Ther Exercise 51 3   []  Manual Therapy     []  Ther Activities     []  Aquatics     []  Vasocompression     []  Other     Total Treatment time 51 3       Assessment: [x] Progressing toward goals. Initiated session with pball roll outs with progressed reps to 10 in each direction in order to improve soft tissue extensibility and reduce posterior chain tension. Added exercises seated on stability ball in order to improve core engagement throughout exercises. Progressed hip abd to using orange resistance band in order to work towards improving strength. Continued with strength program on this date.  Patient able to tolerate all new exercises but reports no change in symptoms at end of tx encounter. Patient reports feeling on fwd flexion in seated exercises and demonstrates lack of core stability throughout exercises. [] No change. [x] Other: Pt mentions she is interested in cryotherapy and would like exercises to complete in her pool at home. Reports she tends to focus better with her eyes closed, however, increased trunk flex + L lateral lean noticed with this  [x] Patient would continue to benefit from skilled physical therapy services in order to: work on gentle flexibility and strengthening for postural improvement and also manual therapy to try and improve symptoms and fascial restrictions    Problems:    [x]? ? Pain: 10/10 entire body  [x]? ? ROM: cervical, shoulders, spine, hips, LE  [x]? ? Strength: multiple areas of weakness   [x]? ? Function: 95% deficit per OSWESTRY  [x]? Other: forward bent, leans to L;        STG: (to be met in 5 treatments)  1. ? Pain: reduce pain below 10/10 so she is able to stand for at least 5 minutes prior to sitting down: MET 7/7/22  2. ? ROM: tolerate light flexibility exercises to address flexed posture: MET 7/7/22  3. ? Strength: tolerate light core and hip LE exercises to support spine: MET 7/7/22  4. ? Function: sit with improved posture  5. Patient to be independent with home exercise program as demonstrated by performance with correct form without cues. ONGOING 7/7/22  6. Demonstrate Knowledge of fall prevention: ONGOING 7/7/22  LTG: (to be met in 10 treatments)  1.  pain below 8/10  2. Stand with improved posture      Patient goals: feel better    Pt. Education:  [] Yes  [x] No  [] Reviewed Prior HEP/Ed  Method of Education: [x] Verbal postural awareness, TrA  [x] Demo new stretches/ ex's [x] Written  Access Code: [email or web address]  URL: [email or web address]  Date: 07/05/2022  Exercises  Seated Scapular Retraction - 1 x daily - 5 x weekly - 2 sets - 10 reps  Seated Cervical

## 2022-07-14 ENCOUNTER — APPOINTMENT (OUTPATIENT)
Dept: PHYSICAL THERAPY | Facility: CLINIC | Age: 37
End: 2022-07-14
Payer: MEDICARE

## 2022-07-19 ENCOUNTER — HOSPITAL ENCOUNTER (OUTPATIENT)
Dept: PHYSICAL THERAPY | Facility: CLINIC | Age: 37
Setting detail: THERAPIES SERIES
Discharge: HOME | End: 2022-07-19
Payer: MEDICARE

## 2022-07-19 PROCEDURE — 97110 THERAPEUTIC EXERCISES: CPT

## 2022-07-19 NOTE — FLOWSHEET NOTE
[x] [address] [facility]  Outpatient Rehabilitation &  Therapy  [address]   [address]  P: [phone]  F: [phone]     Physical Therapy Daily Treatment Note    Date:  2022  Patient Name:  Jim Jose    :  1985  MRN: [id number]  Physician: Artie Clements MD                     Insurance: [employer] advantage  Medical Diagnosis: lumbar radiculopathy                  Rehab Codes: M54.17; M54.12; T87; R26.2; M62.81; R20.2; R29.3  Onset Date:                      Next 's appt.:  neurosurgery 22; 22: neurology  Visit# / total visits: 7/10     Cancels/No Shows: 0/1    Subjective:    Pain:  [x] Yes  [] No Location: LBP  Pain Rating: (0-10 scale) 10/10 full spine, neck/head  Pain altered Tx:  [x] No  [] Yes  Action:   Comments: Arrives to clinic over 10 min late this date but able to accommodate. Patient arrives with high pain levels stating she would go to the emergency room but they 'don't do anything for her' as she's to see neuro and pain management. Reports 3 days ago she was at the counter getting food ready and started falling asleep and almost fell over but ended up catching herself. However, went to use the restroom and tried standing up but couldn't feel her legs and fell forward hitting her nose/teeth on tub. Reports she has not been getting sleep d/t taking care of the puppies. Objective:   Modalities:   Precautions: difficulty tolerating supine, do not put in prone.  Concentrate exercises in sitting/standing, maybe try 4-point position  Exercises: bolded 22   Exercise bilat  Reps/ Time Weight/ Level Comments   Seated       Scap squeezes 10x3\"     Chin tucks  10x     Rhomboid stretch  3x15\"  Hands in front clasped together    Cervical ROM 5x10\" ea  Side bend, rotation    Postural awareness edu x     Sciatic ns glides 5x  Small ROM for LAQ   Cane flexion, ER 10xea 1# cane     TrA activation  5x5\"     Hs stretch  3x10\"     Hip IR/ER stretch  5x5\" Manual     Physio ball roll outs  10x5\" ea blue PB Fwd, lateral    Heel/toe raise 10x     Inv/ev 10x  New    Hip abd  10x     Hip add 10x Ball     Seated piriformis stretch 3x10\"  Added 22                     Seated on pball   Gait belt    PPT/APT 10x ea Blue pball Added 22   Seated marches with TrA activation 10x ea  Added 22   TrA activation 5x5\"  Added 22   Seated alt UEs + TA 10ea  New    HAB + TA 10x  New          Supine  declines     Other: notes increased pain and migraine sx's when in supine also mentions she does not tolerate prone well   Manual therapy: side lying R then L with manual releases performed. Anterior parietal peritoneum, anterior scar release, B kidney/psoas organ specific fascial mobilization performed. Sigmoid release with pt in R side lying. L sacrotuberous ligament release. used hip extension movement as another way of determining area of tightness in lumbosacral spine, pelvis and hips. L side more restricted than R.      22: supine with head elevated. Fascial listening revealed tightness anteriorly. Release of R sciatic nn x 1; anterior parietal peritoneum release; various fascial restrictions released including towards  scar. Release of sacrum with patient in same position as therapist was able. Standing/leaning over table: release of posterior parietal peritoneum    Specific Instructions for next treatment: work on posture, body mechanics, light spine, hip, shoulder ROM. Julissa Gamez to do manual, improve core stability and activation    Treatment Charges: Mins Units   []  Modalities     [x]  Ther Exercise 45 3   []  Manual Therapy     []  Ther Activities     []  Aquatics     []  Vasocompression     []  Other     Total Treatment time 45 3       Assessment: [] Progressing toward goals. [x] No change.  Pt reports increased pull felt after last therapy session with PB roll outs so instructed to maintain a pain free and comfortable range feeling a slight pull to stretch musculature. Pt demonstrates reduced control of her L ankle during sciatic ns glides this date so added inv/ev to improve ankle mobility and control of motion. Progressed core stabilization exercises on physioball with addition of alternating UE's and HAB with focus on TA activation. Pt notes reduced ms tightness and reduced pain levels post tx. Encouraged pt to focus on getting a good nights sleep to improve energy levels ect. [x] Other: Pt mentions she is interested in cryotherapy and would like exercises to complete in her pool at home. Reports she tends to focus better with her eyes closed, however, increased trunk flex + L lateral lean noticed with this  [x] Patient would continue to benefit from skilled physical therapy services in order to: work on gentle flexibility and strengthening for postural improvement and also manual therapy to try and improve symptoms and fascial restrictions    Problems:    [x] ? Pain: 10/10 entire body  [x] ? ROM: cervical, shoulders, spine, hips, LE  [x] ? Strength: multiple areas of weakness   [x] ? Function: 95% deficit per OSWESTRY  [x] Other: forward bent, leans to L;        STG: (to be met in 5 treatments)  ? Pain: reduce pain below 10/10 so she is able to stand for at least 5 minutes prior to sitting down: MET 7/7/22  ? ROM: tolerate light flexibility exercises to address flexed posture: MET 7/7/22  ? Strength: tolerate light core and hip LE exercises to support spine: MET 7/7/22  ? Function: sit with improved posture  Patient to be independent with home exercise program as demonstrated by performance with correct form without cues. ONGOING 7/7/22  Demonstrate Knowledge of fall prevention: ONGOING 7/7/22  LTG: (to be met in 10 treatments)   pain below 8/10  Stand with improved posture      Patient goals: feel better    Pt.  Education:  [] Yes  [x] No  [] Reviewed Prior HEP/Ed  Method of Education: [x] Verbal postural awareness, TrA  [x] Demo new stretches/ ex's [] Written  Access Code: [email or web address]  URL: [email or web address]  Date: 07/05/2022  Exercises  Seated Scapular Retraction - 1 x daily - 5 x weekly - 2 sets - 10 reps  Seated Cervical Sidebending AROM - 1 x daily - 5 x weekly - 5 sets - 10sec hold  Seated Cervical Rotation AROM - 1 x daily - 5 x weekly - 5 sets - 10sec hold  Seated Hamstring Stretch - 1 x daily - 5 x weekly - 3 sets - 10 sec hold  Seated Heel Raise - 1 x daily - 5 x weekly - 2 sets - 10 reps  Forward and Backward Stepping at [employer] - 1 x daily - 5 x weekly - 1 sets - 10 reps  Seated Hip Abduction - 1 x daily - 5 x weekly - 2 sets - 10 reps  Seated Hip Adduction Isometrics with Ball - 1 x daily - 5 x weekly - 2 sets - 10 reps - 5 sec hold  Seated Transversus Abdominis Bracing - 1 x daily - 5 x weekly - 10 reps - 5 sec hold    Comprehension of Education:  [] Verbalizes understanding. [] Demonstrates understanding. [x] Needs review. For compliance  [] Demonstrates/verbalizes HEP/Ed previously given. Plan: [x] Continue current frequency toward long and short term goals. [x] Specific Instructions for subsequent treatments: continue to improve core stability.       Time In: 11:15 AM         Time Out: 12:00 pm     Electronically signed by:  Angela Romero PTA

## 2022-07-28 ENCOUNTER — HOSPITAL ENCOUNTER (OUTPATIENT)
Dept: PHYSICAL THERAPY | Facility: CLINIC | Age: 37
Setting detail: THERAPIES SERIES
Discharge: HOME | End: 2022-07-28
Payer: MEDICARE

## 2022-07-28 NOTE — FLOWSHEET NOTE
[] [address]  [address]    [phone]  F: [phone]   [x] [address]  [facility] [address]   [address]  P: [phone]  F: [phone]  [] [address] [facility] &  Therapy  [address] [facility]  P: [phone]  F: [phone] [] [address]  P: [phone]  F: [phone]  [] [address]  [facility]   [address]   Briseida Orozcoer: [phone]  F: [phone]   [] [address]  [place]ucho Durán: [phone]   F: [phone]     Physical Therapy Cancel/No Show note    Date: 2022  Patient: Jovan Carrasco  : 1985  MRN: [id number]    Cancels/No Shows to date:     For today's appointment patient:        [x] Cancel     Reason given by patient:        [x] Patient ill        Comments:  Pt CX d/t being ill. States she will call back to reschedule.          Electronically signed by: Michell Feng PTA

## 2022-08-31 ENCOUNTER — OFFICE VISIT (OUTPATIENT)
Dept: PODIATRY | Age: 37
End: 2022-08-31
Payer: MEDICARE

## 2022-08-31 VITALS — BODY MASS INDEX: 32.2 KG/M2 | WEIGHT: 175 LBS | HEIGHT: 62 IN

## 2022-08-31 DIAGNOSIS — M79.605 PAIN IN BOTH LOWER EXTREMITIES: ICD-10-CM

## 2022-08-31 DIAGNOSIS — L60.0 INGROWN NAIL OF GREAT TOE OF LEFT FOOT: ICD-10-CM

## 2022-08-31 DIAGNOSIS — L60.0 INGROWN NAIL OF GREAT TOE OF RIGHT FOOT: Primary | ICD-10-CM

## 2022-08-31 DIAGNOSIS — M79.604 PAIN IN BOTH LOWER EXTREMITIES: ICD-10-CM

## 2022-08-31 PROCEDURE — G8427 DOCREV CUR MEDS BY ELIG CLIN: HCPCS | Performed by: PODIATRIST

## 2022-08-31 PROCEDURE — 99203 OFFICE O/P NEW LOW 30 MIN: CPT | Performed by: PODIATRIST

## 2022-08-31 PROCEDURE — 4004F PT TOBACCO SCREEN RCVD TLK: CPT | Performed by: PODIATRIST

## 2022-08-31 PROCEDURE — G8417 CALC BMI ABV UP PARAM F/U: HCPCS | Performed by: PODIATRIST

## 2022-08-31 NOTE — PROGRESS NOTES
Elijah Pierre APRN - CNP   clonazePAM (KLONOPIN) 1 MG tablet  11/3/21  Yes Historical Provider, MD   LATUDA 40 MG TABS tablet  10/7/21  Yes Historical Provider, MD   Menthol, Topical Analgesic, (BIOFREEZE EX) Apply topically as needed   Yes Historical Provider, MD   medroxyPROGESTERone (DEPO-PROVERA) 150 MG/ML injection INJECT 1 ML INTO THE MUSCLE ONCE FOR 1 DOSE 22  Barby Greenfield APRN - CNP   silver sulfADIAZINE (SILVADENE) 1 % cream Apply topically daily. Patient not taking: Reported on 2022   Zena Best MD       Past Surgical History:   Procedure Laterality Date    CERVICAL DISCECTOMY  15    with fusion C4-5, C5-6     SECTION      x3    WRIST GANGLION EXCISION Left        Family History   Problem Relation Age of Onset    Cancer Mother         breast       Social History     Tobacco Use    Smoking status: Every Day     Packs/day: 1.00     Years: 15.00     Pack years: 15.00     Types: Cigarettes     Start date: 2004     Last attempt to quit: 2021     Years since quittin.6    Smokeless tobacco: Never    Tobacco comments:     Nothing   Substance Use Topics    Alcohol use: No       Review of Systems    Review of Systems:   History obtained from chart review and the patient  General ROS: negative for - chills, fatigue, fever, night sweats or weight gain  Constitutional: Negative for chills, diaphoresis, fatigue, fever and unexpected weight change. Musculoskeletal: Positive for arthralgias, gait problem and joint swelling. Neurological ROS: negative for - behavioral changes, confusion, headaches or seizures. Negative for weakness and numbness. Dermatological ROS: negative for - mole changes, rash  Cardiovascular: Negative for leg swelling. Gastrointestinal: Negative for constipation, diarrhea, nausea and vomiting. Lower Extremity Physical Examination:   Vitals: There were no vitals filed for this visit. General: AAO x 3 in NAD. Dermatologic Exam:  Malachi hallux nails are incurvated with edema      Musculoskeletal:     1st MPJ ROM decreased, Bilateral.  Muscle strength 5/5, Bilateral.  Pain present upon palpation of malachi hallux nails. Medial longitudinal arch, Bilateral WNL. Ankle ROM WNL,Bilateral.    Dorsally contracted digits absent digits 1-5 Bilateral.     Vascular: DP and PT pulses palpable 2/4, Bilateral.  CFT <3 seconds, Bilateral.  Hair growth present to the level of the digits, Bilateral.  Edema absent, Bilateral.  Varicosities absent, Bilateral. Erythema absent, Bilateral    Neurological: Sensation intact to light touch to level of digits, Bilateral.  Protective sensation intact 10/10 sites via 5.07/10g [place]is Monofilament, Bilateral.  negative Tinel's, Bilateral.  negative Valleix sign, Bilateral.      Integument: Warm, dry, supple, Bilateral.  Open lesion absent, Bilateral.  Interdigital maceration absent to web spaces 1-4, Bilateral.  Fissures absent, Bilateral.       Asessment: Patient is a 40 y.o. female with:      Diagnosis Orders   1. Ingrown nail of great toe of right foot        2. Ingrown nail of great toe of left foot        3. Pain in both lower extremities            Plan: Patient examined and evaluated. Current condition and treatment options discussed in detail. Discussed conservative and surgical options with the patient. . Advised pt to consider nail avulsion at next visit if symptoms persist or worsen. Pt to monitor for increased signs of infection such as erythema, edema and drainage. All labs were reviewed and all imagining including the above findings were reviewed PRIOR to the patients arrival and with the patient today. Previous patient encounter was reviewed. Encounters from the patients other medical providers were reviewed and noted. Time was spent educating the patient on proper care of the feet and ankles.   All the above diagnosis were addressed at todays visit and all questions were answered. A total of 30 minutes was spent with this patients encounter which included charting after the patients visit      Verbal and written instructions given to patient. Contact office with any questions/problems/concerns. RTC in 2week(s).     8/31/2022    Electronically signed by Yaz Mendez DPM on 8/31/2022 at 2:48 PM  8/31/2022

## 2022-09-19 ENCOUNTER — PROCEDURE VISIT (OUTPATIENT)
Dept: PHYSICAL MEDICINE AND REHAB | Age: 37
End: 2022-09-19
Payer: MEDICARE

## 2022-09-19 DIAGNOSIS — M54.50 CHRONIC BILATERAL LOW BACK PAIN WITHOUT SCIATICA: Primary | ICD-10-CM

## 2022-09-19 DIAGNOSIS — G89.29 CHRONIC BILATERAL LOW BACK PAIN WITHOUT SCIATICA: Primary | ICD-10-CM

## 2022-09-19 DIAGNOSIS — M79.604 BILATERAL LEG PAIN: ICD-10-CM

## 2022-09-19 DIAGNOSIS — M79.605 BILATERAL LEG PAIN: ICD-10-CM

## 2022-09-19 PROCEDURE — 95886 MUSC TEST DONE W/N TEST COMP: CPT | Performed by: STUDENT IN AN ORGANIZED HEALTH CARE EDUCATION/TRAINING PROGRAM

## 2022-09-19 PROCEDURE — 95909 NRV CNDJ TST 5-6 STUDIES: CPT | Performed by: STUDENT IN AN ORGANIZED HEALTH CARE EDUCATION/TRAINING PROGRAM

## 2022-09-21 ENCOUNTER — TELEMEDICINE (OUTPATIENT)
Dept: NEUROLOGY | Age: 37
End: 2022-09-21
Payer: MEDICARE

## 2022-09-21 DIAGNOSIS — M54.16 LUMBAR RADICULOPATHY: Primary | ICD-10-CM

## 2022-09-21 DIAGNOSIS — R93.89 ABNORMAL MRI: ICD-10-CM

## 2022-09-21 PROCEDURE — 4004F PT TOBACCO SCREEN RCVD TLK: CPT | Performed by: PSYCHIATRY & NEUROLOGY

## 2022-09-21 PROCEDURE — 99214 OFFICE O/P EST MOD 30 MIN: CPT | Performed by: PSYCHIATRY & NEUROLOGY

## 2022-09-21 PROCEDURE — G8417 CALC BMI ABV UP PARAM F/U: HCPCS | Performed by: PSYCHIATRY & NEUROLOGY

## 2022-09-21 PROCEDURE — G8428 CUR MEDS NOT DOCUMENT: HCPCS | Performed by: PSYCHIATRY & NEUROLOGY
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
## 2022-09-21 NOTE — PROGRESS NOTES
[facility] PHYSICAL MEDICINE AND REHABILITATION  [address] [place] [id number]  Dept: [phone]  Dept Fax: [phone]    EMG/NCS Bilateral Lower Limbs    Subjective:     Zari Weaver is a 40y.o.-year-old female presenting with low back pain with radiation to the bilateral lower limbs, right greater than left, for more than 10 years. She notes that pain was intermittent at first but has progressively worsened over time and is now constant. She states that pain affects the whole lower limb bilaterally. She also notes numbness/tingling in the bilateral feet and calves as well as weakness in the bilateral lower limbs. She feels like she cannot stand for more than 2 minutes without significant pain. PMH:  no diabetes, no thyroid disease    PSH:  no back surgery    Objective:     Physical Exam    Constitutional: She appears well-developed and well-nourished. In no distress. Pulmonary/Chest: Respirations WNL and unlabored. Neurological: She is alert. Sensation to light touch decreased at the right medial ankle compared to the left. Strength 4/5 in bilateral lower limbs (some give-way weakness noted). No clonus with passive ankle dorsiflexion bilaterally. Mildly positive seated straight leg raise on the right, negative seated straight leg raise on the left. Skin: Skin is warm and dry with good turgor. Imaging  MRI lumbar spine, 8/26/21:  Impression   Degenerative change at the lumbosacral junction with mild bilateral foraminal   narrowing and narrowing of the lateral recesses. Findings:     The procedure was explained to the patient prior to beginning the test.  Risks and benefits of the procedure were discussed. Patient gave verbal consent to proceed.     The left sural sensory study is normal.  The left peroneal/fibular motor study to EDB is normal.  The left tibial motor study to AHB is normal.    The right sural sensory study is normal.  The right peroneal/fibular motor study to EDB is normal; this study shows evidence of an accessory peroneal/fibular nerve with stimulation behind the lateral malleolus, which is a normal variant. The right tibial motor study to AHB is normal.    EMG of selected muscles of the left lower limb is normal, except the medial gastrocnemius was difficult to recruit and the flexor digitorum longus could not be recruited; patient had difficulty activating these muscles/decreased effort. EMG of selected muscles of the right lower limb is normal, except the extensor hallucis longus was difficult to recruit and the flexor digitorum longus could not be recruited; patient had difficulty activating these muscles/decreased effort. Impression:     Normal electrodiagnostic study. There is evidence of an accessory peroneal/fibular nerve on the right, which is a normal variant. There is no evidence of bilateral peripheral neuropathy, lumbosacral radiculopathy, plexopathy, or myopathy. Please see separate document with nerve conduction and EMG tables.       Electronically signed by Berhane Franco MD on 9/21/2022 at 8:36 AM

## 2022-09-22 DIAGNOSIS — G56.23 ULNAR NEUROPATHY OF BOTH UPPER EXTREMITIES: ICD-10-CM

## 2022-09-22 DIAGNOSIS — G56.03 BILATERAL CARPAL TUNNEL SYNDROME: Primary | ICD-10-CM

## 2022-09-22 DIAGNOSIS — M47.12 CERVICAL SPONDYLOSIS WITH MYELOPATHY: ICD-10-CM

## 2022-09-26 ENCOUNTER — TELEPHONE (OUTPATIENT)
Dept: PHYSICAL MEDICINE AND REHAB | Age: 37
End: 2022-09-26

## 2022-09-26 NOTE — TELEPHONE ENCOUNTER
Patient called re EMG she had done on 09/19/2022. She stated that since the test was completed, she's been having \"severe\" cramping in her leg that goes all the way to her foot. She has also noted that one of her thumbs has been numb since the procedure. She stated she's had EMGs before without issue, but was wondering if the cramping or thumb numbness might be related to the test?    I told patient I didn't think this would be related but I would double check with you.

## 2022-09-28 ENCOUNTER — TELEPHONE (OUTPATIENT)
Dept: NEUROLOGY | Age: 37
End: 2022-09-28

## 2022-09-28 DIAGNOSIS — M54.16 LUMBAR RADICULOPATHY: Primary | ICD-10-CM

## 2022-09-28 NOTE — TELEPHONE ENCOUNTER
Patient phoned in as a follow up to the televisit of 9/21/2022. Patient has a form from Medicare and she is trying to apply for social security. Patient is asking for an order to have a Functional Capacity Evaluation done. Please advise.

## 2022-09-28 NOTE — TELEPHONE ENCOUNTER
Called patient back and left message to let her know symptoms are most likely not related to the EMG and that she should follow up with Dr. Denilson Bates office to go over results of the testing. She had stated Dr. Denilson Bates office was having computer problems and couldn't see the report earlier this week, so I advised her to have them call us if there was still an issue and we could fax a paper copy.

## 2022-09-30 NOTE — TELEPHONE ENCOUNTER
I called Paola Daley and she is agreeable to Jarod at [address] [place] [address]. PT office.

## 2022-10-10 NOTE — DISCHARGE SUMMARY
[] [facility]        Outpatient Physical                Therapy       [address]       Phone: [phone]       Fax: [phone] [x] [facility]       Promotion at [address] [place] [address]       Phone: [phone]       Fax: [phone] [] DaishaangelajoryHector Gamez [facility] Promotion     [facility]     Phone: [phone]     Fax:  [phone]     Physical Therapy Discharge Note    Date: 10/10/2022      Patient: Delmy Parker  : 1985  MRN: [id number]    Physician: Terry Larry MD                     Insurance: [employer]  Medical Diagnosis: lumbar radiculopathy                  Rehab Codes: M54.17; M54.12; S60; R26.2; M62.81; R20.2; R29.3  Onset Date:                      Next 's appt.:  neurosurgery 22; 22: neurology  Visit# / total visits: 7/10     Cancels/No shows: 1/3  Date of initial visit: 22                Date of final visit: 22       Discharge Status:     Pt failed to make additional appointments for therapy. Pt. Is now discharged. Electronically signed by: Fabian Werner PT    If you have any questions or concerns, please don't hesitate to call.   Thank you for your referral.

## 2022-11-01 ENCOUNTER — HOSPITAL ENCOUNTER (OUTPATIENT)
Dept: PHYSICAL THERAPY | Facility: CLINIC | Age: 37
Setting detail: THERAPIES SERIES
Discharge: HOME | End: 2022-11-01
Payer: MEDICARE

## 2022-11-01 PROCEDURE — 97750 PHYSICAL PERFORMANCE TEST: CPT

## 2023-01-04 ENCOUNTER — TELEPHONE (OUTPATIENT)
Dept: NEUROLOGY | Age: 38
End: 2023-01-04

## 2023-01-04 NOTE — TELEPHONE ENCOUNTER
01 04 2023 called times 2 to schedule March of 2023 follow up appointment with Dr. Candi Domínguez, no response, mailed letter.   KS

## 2023-02-07 ENCOUNTER — PROCEDURE VISIT (OUTPATIENT)
Dept: PHYSICAL MEDICINE AND REHAB | Age: 38
End: 2023-02-07

## 2023-02-07 DIAGNOSIS — M47.12 CERVICAL SPONDYLOSIS WITH MYELOPATHY: ICD-10-CM

## 2023-02-07 DIAGNOSIS — G56.23 ULNAR NEUROPATHY OF BOTH UPPER EXTREMITIES: Primary | ICD-10-CM

## 2023-02-15 NOTE — PROGRESS NOTES
[address] [facility] PHYSICAL MEDICINE AND REHABILITATION  [address] [place] [id number]  Dept: [phone]  Dept Fax: [phone]    EMG/NCS Bilateral Upper Limbs    Subjective:     Terry Burch is a 40y.o.-year-old right-handed female presenting with left greater than right hand numbness. She states that the numbness is present primarily in the 5th digit of the left hand but also spreads to the whole hand. She also notes that the left shoulder, elbow, and wrist feel \"locked\" in the morning upon waking, which is painful. She reports numbness and shooting pain in the medial right forearm with reaching overhead. She states that left-sided symptoms have been present for about 11-12 years and right-sided symptoms have been present for about 4 months. She also notes weakness in the upper limbs, left greater than right. She states that she has neck pain as well. PMH:  no diabetes, no thyroid disease    PSH:  +C4-C6 anterior fusion, +left wrist ganglion cyst removal    Objective:     Physical Exam    Constitutional: She appears well-developed and well-nourished. In no distress. Pulmonary/Chest: Respirations WNL and unlabored. MSK:  Decreased cervical spine ROM; pain with all movements. Neurological: She is alert. Sensation to light touch decreased on the palmar and dorsal aspects of the the 3rd and 5th digits of the left hand compared to right. Sensation to light touch decreased in the medial right forearm and the lateral left forearm. Strength 4+/5 in the bilateral upper limbs, except strength 4/5 with left finger abduction. Negative Hubbard's reflex bilaterally. Skin: Skin is warm and dry with good turgor. Imaging  MRI cervical spine, 8/26/21:  Impression   Anterior fixation from J7-M7 without complication. Multilevel degenerative change with canal stenosis most pronounced at C5-6   and C6-7.        Moderate to severe right foraminal narrowing and mild left foraminal   narrowing at C3-4 as well as mild bilateral foraminal narrowing at C5-6. CT cervical spine, 6/21/22:  Impression   No acute bony abnormality of the cervical spine. Anterior cervical discectomy and fusion spanning C4 through C6. Mature   osseous fusion across the disc space at C4-C5. Only minimal partial anterior   ankylosis across the C5-C6 disc spacer. Mild to moderate osseous neuroforaminal narrowing predominately due to   uncovertebral spurring at C3-C4 and C5-C6. Findings:     The procedure was explained to the patient prior to beginning the test.  Risks and benefits of the procedure were discussed. Patient gave verbal consent to proceed. The left median sensory studies to digits 1 and 3 are normal.  The left radial sensory study to digit 1 is normal.  The left ulnar sensory study to digit 5 is normal.  The left median motor study to APB is normal.  The left ulnar motor study to ADM shows normal latency and amplitude; however, there is a decrease in conduction velocity from proximal to distal, which appears to be localized to the cubital tunnel. The right median sensory studies to digits 1 and 3 are normal.  The right radial sensory study to digit 1 is normal.  The right ulnar sensory study to digit 5 is normal.  The right median motor study to APB is normal.  The right ulnar motor study to ADM shows normal latency and amplitude; however, there is a decrease in conduction velocity from proximal to distal, which appears to be localized to the cubital tunnel. EMG of selected muscles of the left upper limb is normal.  EMG of the right upper limb was deferred, as patient's symptoms are worst on the left and needle EMG of this limb is normal, as noted. Impression:     Abnormal electrodiagnostic study.   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  There is no evidence of bilateral median neuropathy. There is no evidence of left cervical radiculopathy, plexopathy, or myopathy. Please see separate document with nerve conduction and EMG tables.       Electronically signed by Vero Diallo MD on 2/14/2023 at 9:11 PM

## 2023-03-20 ENCOUNTER — TELEPHONE (OUTPATIENT)
Dept: NEUROLOGY | Age: 38
End: 2023-03-20

## 2023-03-20 NOTE — TELEPHONE ENCOUNTER
LMOM for patient x 6 that this appointment needs to be rescheduled from 03 20 2023, there was a message left that the appointment is cancelled and asked the patient to call and reschdule, cancelled appointment and mailed letter  [phone]

## 2023-08-10 DIAGNOSIS — Z01.818 PREOP TESTING: Primary | ICD-10-CM
